# Patient Record
Sex: MALE | Race: OTHER | HISPANIC OR LATINO | Employment: FULL TIME | URBAN - METROPOLITAN AREA
[De-identification: names, ages, dates, MRNs, and addresses within clinical notes are randomized per-mention and may not be internally consistent; named-entity substitution may affect disease eponyms.]

---

## 2020-02-10 ENCOUNTER — APPOINTMENT (EMERGENCY)
Dept: RADIOLOGY | Facility: HOSPITAL | Age: 45
End: 2020-02-10
Payer: COMMERCIAL

## 2020-02-10 ENCOUNTER — HOSPITAL ENCOUNTER (EMERGENCY)
Facility: HOSPITAL | Age: 45
Discharge: HOME | End: 2020-02-10
Attending: EMERGENCY MEDICINE | Admitting: EMERGENCY MEDICINE
Payer: COMMERCIAL

## 2020-02-10 VITALS
WEIGHT: 250 LBS | SYSTOLIC BLOOD PRESSURE: 149 MMHG | HEART RATE: 79 BPM | RESPIRATION RATE: 17 BRPM | OXYGEN SATURATION: 98 % | DIASTOLIC BLOOD PRESSURE: 96 MMHG | TEMPERATURE: 97.8 F

## 2020-02-10 DIAGNOSIS — J02.9 VIRAL PHARYNGITIS: Primary | ICD-10-CM

## 2020-02-10 DIAGNOSIS — M79.646 FINGER PAIN: ICD-10-CM

## 2020-02-10 LAB — S PYO DNA THROAT QL NAA+PROBE: NORMAL

## 2020-02-10 PROCEDURE — 99284 EMERGENCY DEPT VISIT MOD MDM: CPT | Performed by: EMERGENCY MEDICINE

## 2020-02-10 PROCEDURE — 87651 STREP A DNA AMP PROBE: CPT | Performed by: EMERGENCY MEDICINE

## 2020-02-10 PROCEDURE — 99283 EMERGENCY DEPT VISIT LOW MDM: CPT

## 2020-02-10 PROCEDURE — 73140 X-RAY EXAM OF FINGER(S): CPT

## 2020-02-10 RX ADMIN — DEXAMETHASONE SODIUM PHOSPHATE 10 MG: 10 INJECTION, SOLUTION INTRAMUSCULAR; INTRAVENOUS at 18:34

## 2020-02-10 NOTE — ED PROVIDER NOTES
History  Chief Complaint   Patient presents with    Sore Throat     Pt reports sore throat starting today, family members dxed with strep at home     Finger Pain     Pt c/o right 4th finger pain and swelling for weeks      Patient presents for evaluation of a sore throat and finger pain  Patient states that everyone in his house has recently been diagnosed with sore throat, his 2 kids that are there currently as well as his 2 children who are currently in [facility]   He complains of having a sore throat which is worse with swallowing  Denies any fever, chills, nausea, vomiting, difficulty handling oral secretions, change in voice, difficulty swallowing, difficulty breathing, shortness of breath  Patient also is complaining of difficulty extending his 4th digit at the MCP joint when it is fully flex  When this occurs he is unable to extended any needs to the provide downward traction on the joint to allow to pop back in place  He states this is been going on for the past couple months and can remember no distinct trauma to the area  He endorses some pain on the dorsal and volar aspect of the joint with no radiations up into his wrist   Denies any changes sensation or loss of strength throughout  Patient has no other complaints at this time  None       History reviewed  No pertinent past medical history  History reviewed  No pertinent surgical history  History reviewed  No pertinent family history  I have reviewed and agree with the history as documented  Social History     Tobacco Use    Smoking status: Current Every Day Smoker     Packs/day: 0 20    Smokeless tobacco: Never Used   Substance Use Topics    Alcohol use: Not Currently    Drug use: Not Currently        Review of Systems   Constitutional: Negative for activity change, chills, fatigue and fever  HENT: Positive for sore throat  Negative for congestion, postnasal drip, rhinorrhea, trouble swallowing and voice change  Respiratory: Negative for cough and shortness of breath  Cardiovascular: Negative for chest pain and palpitations  Gastrointestinal: Negative for abdominal distention, abdominal pain, constipation, diarrhea, nausea and vomiting  Genitourinary: Negative for dysuria and hematuria  Musculoskeletal: Positive for arthralgias and myalgias  Negative for neck pain  Neurological: Negative for dizziness, syncope, light-headedness and headaches  All other systems reviewed and are negative  Physical Exam  ED Triage Vitals [02/10/20 1631]   Temperature Pulse Respirations Blood Pressure SpO2   97 8 °F (36 6 °C) 79 17 149/96 98 %      Temp Source Heart Rate Source Patient Position - Orthostatic VS BP Location FiO2 (%)   Tympanic Monitor Sitting Right arm --      Pain Score       6             Orthostatic Vital Signs  Vitals:    02/10/20 1631   BP: 149/96   Pulse: 79   Patient Position - Orthostatic VS: Sitting       Physical Exam   Constitutional: He is oriented to person, place, and time  He appears well-developed and well-nourished  No distress  HENT:   Head: Normocephalic and atraumatic  Right Ear: Tympanic membrane, external ear and ear canal normal  No drainage or swelling  Left Ear: Tympanic membrane, external ear and ear canal normal  No drainage or swelling  Mouth/Throat: Uvula is midline and oropharynx is clear and moist  No uvula swelling  No oropharyngeal exudate or tonsillar abscesses  Tonsils are 2+ on the right  Tonsils are 2+ on the left  No tonsillar exudate  Eyes: Pupils are equal, round, and reactive to light  Conjunctivae and EOM are normal  Right eye exhibits no discharge  Left eye exhibits no discharge  Neck: Normal range of motion  Neck supple  No JVD present  No tracheal deviation present  Cardiovascular: Normal rate, regular rhythm, normal heart sounds and intact distal pulses  Exam reveals no gallop and no friction rub  No murmur heard    Pulmonary/Chest: Effort normal and breath sounds normal  No respiratory distress  He has no wheezes  He has no rales  Abdominal: Soft  Bowel sounds are normal  He exhibits no distension and no mass  There is no tenderness  There is no guarding  Musculoskeletal: Normal range of motion  He exhibits no edema, tenderness or deformity  Neurological: He is alert and oriented to person, place, and time  No cranial nerve deficit or sensory deficit  He exhibits normal muscle tone  Coordination normal    Skin: He is not diaphoretic  Psychiatric: He has a normal mood and affect  His behavior is normal  Judgment and thought content normal    Vitals reviewed  ED Medications  Medications   dexamethasone 10 mg/mL oral liquid 10 mg 1 mL (10 mg Oral Given 2/10/20 1834)       Diagnostic Studies  Results Reviewed     Procedure Component Value Units Date/Time    Strep A PCR [[id number]]  (Normal) Collected:  02/10/20 1701    Lab Status:  Final result Specimen:  Throat Updated:  02/10/20 1816     STREP A PCR None Detected                 XR finger right fourth digit-ring    (Results Pending)         Procedures  Procedures      ED Course                               MDM  Number of Diagnoses or Management Options  Finger pain:   Viral pharyngitis:   Diagnosis management comments: X-ray of the right hand failed to show any acute pathology  Strep swab is negative  Provided Decadron here in the emergency department for improvement of his symptoms  Advised him to follow-up with orthopedics for further evaluation of his left finger discomfort          Disposition  Final diagnoses:   Viral pharyngitis   Finger pain     Time reflects when diagnosis was documented in both MDM as applicable and the Disposition within this note     Time User Action Codes Description Comment    2/10/2020  6:31 PM Roselyn Ocampo Add [J02 9] Viral pharyngitis     2/10/2020  6:32 PM Roselyn Ocampo Add [L53 033] Finger pain       ED Disposition     ED Disposition Condition Date/Time Comment    Discharge Stable Mon Feb 10, 2020  6:31 PM Marat Delarosa discharge to home/self care  Follow-up Information     Follow up With Specialties Details Why [address] [facility] Emergency Department Emergency Medicine   [address]  [phone]  ED, [address] [id number] [facility]   [address] [id number]  [phone] [address] [place] [address], [address] [place] [address], [address] [place] [address]    Your PCP  In 3 days             There are no discharge medications for this patient  No discharge procedures on file  ED Provider  Attending physically available and evaluated Marta Delarosa I managed the patient along with the ED Attending      Electronically Signed by         Tristian Vaca MD  02/11/20 0056

## 2020-02-11 NOTE — ED ATTENDING ATTESTATION
2/10/2020  ICarey MD, saw and evaluated the patient  I have discussed the patient with the resident/non-physician practitioner and agree with the resident's/non-physician practitioner's findings, Plan of Care, and MDM as documented in the resident's/non-physician practitioner's note, except where noted  All available labs and Radiology studies were reviewed  I was present for key portions of any procedure(s) performed by the resident/non-physician practitioner and I was immediately available to provide assistance  At this point I agree with the current assessment done in the Emergency Department  I have conducted an independent evaluation of this patient a history and physical is as follows:  Patient with sore throat, has sick contacts  Also has been having problems with trigger finger    Patient with unremarkable exam   Agree with documentation  ED Course         Critical Care Time  Procedures

## 2020-07-10 ENCOUNTER — HOSPITAL ENCOUNTER (EMERGENCY)
Facility: HOSPITAL | Age: 45
Discharge: HOME | End: 2020-07-10
Attending: EMERGENCY MEDICINE | Admitting: EMERGENCY MEDICINE
Payer: MEDICARE

## 2020-07-10 VITALS
SYSTOLIC BLOOD PRESSURE: 138 MMHG | OXYGEN SATURATION: 98 % | DIASTOLIC BLOOD PRESSURE: 76 MMHG | TEMPERATURE: 98.1 F | WEIGHT: 243.83 LBS | RESPIRATION RATE: 18 BRPM | HEART RATE: 74 BPM

## 2020-07-10 DIAGNOSIS — L02.91 ABSCESS: Primary | ICD-10-CM

## 2020-07-10 PROCEDURE — 99284 EMERGENCY DEPT VISIT MOD MDM: CPT | Performed by: PHYSICIAN ASSISTANT

## 2020-07-10 PROCEDURE — 99282 EMERGENCY DEPT VISIT SF MDM: CPT

## 2020-07-10 PROCEDURE — 10060 I&D ABSCESS SIMPLE/SINGLE: CPT | Performed by: PHYSICIAN ASSISTANT

## 2020-07-10 RX ORDER — LIDOCAINE HYDROCHLORIDE AND EPINEPHRINE 10; 10 MG/ML; UG/ML
1 INJECTION, SOLUTION INFILTRATION; PERINEURAL ONCE
Status: COMPLETED | OUTPATIENT
Start: 2020-07-10 | End: 2020-07-10

## 2020-07-10 RX ORDER — ACETAMINOPHEN 325 MG/1
650 TABLET ORAL ONCE
Status: COMPLETED | OUTPATIENT
Start: 2020-07-10 | End: 2020-07-10

## 2020-07-10 RX ORDER — CEPHALEXIN 250 MG/1
500 CAPSULE ORAL ONCE
Status: COMPLETED | OUTPATIENT
Start: 2020-07-10 | End: 2020-07-10

## 2020-07-10 RX ORDER — SULFAMETHOXAZOLE AND TRIMETHOPRIM 800; 160 MG/1; MG/1
1 TABLET ORAL ONCE
Status: COMPLETED | OUTPATIENT
Start: 2020-07-10 | End: 2020-07-10

## 2020-07-10 RX ORDER — CEPHALEXIN 500 MG/1
500 CAPSULE ORAL EVERY 6 HOURS SCHEDULED
Qty: 28 CAPSULE | Refills: 0 | Status: SHIPPED | OUTPATIENT
Start: 2020-07-10 | End: 2020-07-17

## 2020-07-10 RX ORDER — SULFAMETHOXAZOLE AND TRIMETHOPRIM 800; 160 MG/1; MG/1
1 TABLET ORAL 2 TIMES DAILY
Qty: 14 TABLET | Refills: 0 | Status: SHIPPED | OUTPATIENT
Start: 2020-07-10 | End: 2020-07-17

## 2020-07-10 RX ADMIN — SULFAMETHOXAZOLE AND TRIMETHOPRIM 1 TABLET: 800; 160 TABLET ORAL at 12:27

## 2020-07-10 RX ADMIN — CEPHALEXIN 500 MG: 250 CAPSULE ORAL at 12:27

## 2020-07-10 RX ADMIN — LIDOCAINE HYDROCHLORIDE,EPINEPHRINE BITARTRATE 1 ML: 10; .01 INJECTION, SOLUTION INFILTRATION; PERINEURAL at 12:27

## 2020-07-10 RX ADMIN — ACETAMINOPHEN 650 MG: 325 TABLET, FILM COATED ORAL at 12:27

## 2020-07-10 NOTE — ED PROVIDER NOTES
History  Chief Complaint   Patient presents with    Abscess     to left leg  has tatoo (20 years ago)  red, swollen, painful, unsure if bit by anything, he is a , used warm compresses      The patient is a 65-year-old male with no significant past medical history presents to the emergency department for evaluation of an abscess to his left anterior lower leg  Patient reports he is a , and he is not sure if he sustained a small cut or a bug bite  He states on Wednesday, he noticed a little raised area with increasing redness  He states he tried squeezing it and was able to get some pus out of it  He states over the last couple of days it has become more swollen and painful  He noted area of redness with increasing  The patient took some Motrin this morning with little relief  Denies fever, chills, nausea, vomiting or headache  History provided by:  Patient   used: No        None       History reviewed  No pertinent past medical history  History reviewed  No pertinent surgical history  History reviewed  No pertinent family history  I have reviewed and agree with the history as documented  E-Cigarette/Vaping     E-Cigarette/Vaping Substances     Social History     Tobacco Use    Smoking status: Current Every Day Smoker     Packs/day: 0 20     Types: Cigarettes    Smokeless tobacco: Never Used   Substance Use Topics    Alcohol use: Not Currently    Drug use: Yes     Types: Marijuana       Review of Systems   Constitutional: Negative for chills and fever  Musculoskeletal: Positive for myalgias  Skin: Positive for wound (Abscess)  Negative for color change  Hematological: Does not bruise/bleed easily  Physical Exam  Physical Exam   Constitutional: He is oriented to person, place, and time  He appears well-developed and well-nourished  HENT:   Head: Normocephalic and atraumatic     Eyes: Conjunctivae and EOM are normal    Neck: Normal range of motion  Neck supple  Musculoskeletal: Normal range of motion  Legs:  Neurological: He is alert and oriented to person, place, and time  Vital Signs  ED Triage Vitals [07/10/20 1144]   Temperature Pulse Respirations Blood Pressure SpO2   98 1 °F (36 7 °C) 74 18 138/76 98 %      Temp Source Heart Rate Source Patient Position - Orthostatic VS BP Location FiO2 (%)   Oral Monitor -- Left arm --      Pain Score       5           Vitals:    07/10/20 1144   BP: 138/76   Pulse: 74         Visual Acuity      ED Medications  Medications   lidocaine-epinephrine (XYLOCAINE/EPINEPHRINE) 1 %-1:100,000 injection 1 mL (1 mL Infiltration Given 7/10/20 1227)   cephalexin (KEFLEX) capsule 500 mg (500 mg Oral Given 7/10/20 1227)   sulfamethoxazole-trimethoprim (BACTRIM DS) 800-160 mg per tablet 1 tablet (1 tablet Oral Given 7/10/20 1227)   acetaminophen (TYLENOL) tablet 650 mg (650 mg Oral Given 7/10/20 1227)       Diagnostic Studies  Results Reviewed     None                 No orders to display              Procedures  Incision and drain  Date/Time: 7/10/2020 12:53 PM  Performed by: Alexx Morgan PA-C  Authorized by: Alexx Morgan PA-C     Patient location:  ED  Other Assisting Provider: No    Consent:     Consent obtained:  Verbal    Consent given by:  Patient    Risks discussed:  Bleeding, incomplete drainage, pain, damage to other organs and infection    Alternatives discussed:  No treatment  Universal protocol:     Procedure explained and questions answered to patient or proxy's satisfaction: yes    Location:     Type:  Abscess    Size:  3x3 cm    Location:  Lower extremity    Lower extremity location:  L leg  Pre-procedure details:     Skin preparation:  Betadine  Anesthesia (see MAR for exact dosages):      Anesthesia method:  Local infiltration    Local anesthetic:  Lidocaine 1% WITH epi  Procedure details:     Complexity:  Simple    Needle aspiration: no      Incision types:  Stab incision Scalpel blade:  11    Approach:  Open    Incision depth:  Subcutaneous    Drainage:  Bloody and purulent    Drainage amount: Moderate    Wound treatment:  Wound left open    Packing materials:  None  Post-procedure details:     Patient tolerance of procedure: Tolerated well, no immediate complications             ED Course                                             MDM  Number of Diagnoses or Management Options  Abscess: new and requires workup  Diagnosis management comments: Patient presents with abscess to left lower leg  There is mild surrounding cellulitis  Performed I&D of abscess  Please see procedure note for more detail  Patient reported relief of pain after the I and D  Patient will be started on a course of Bactrim and Keflex  I advised him to keep the abscess loosely covered to allow continuing drainage  I encouraged warm compresses  I advised that he return to the ED if he develops any worsening swelling or red streaking up his leg  He acknowledged understanding  Patient is appropriate for discharge  Amount and/or Complexity of Data Reviewed  Decide to obtain previous medical records or to obtain history from someone other than the patient: yes  Review and summarize past medical records: yes    Risk of Complications, Morbidity, and/or Mortality  Presenting problems: minimal  Diagnostic procedures: minimal  Management options: minimal    Patient Progress  Patient progress: stable        Disposition  Final diagnoses:   Abscess     Time reflects when diagnosis was documented in both MDM as applicable and the Disposition within this note     Time User Action Codes Description Comment    7/10/2020 12:36 PM Savi Blackmon Add [L02 91] Abscess       ED Disposition     ED Disposition Condition Date/Time Comment    Discharge Stable Fri Jul 10, 2020 12:36 PM Sushma Jean-Baptiste discharge to home/self care              Follow-up Information     Follow up With Specialties Details Why Contact Info Additional Information    Janel Hurst MD Family Medicine Schedule an appointment as soon as possible for a visit  As needed [address] [address]  [address] [place] [address] Emergency Department Emergency Medicine  If symptoms worsen [address] [facility] [address] [phone] [facility] ED, [address], [place], [place], [address]          Discharge Medication List as of 7/10/2020 12:37 PM      START taking these medications    Details   cephalexin (KEFLEX) 500 mg capsule Take 1 capsule (500 mg total) by mouth every 6 (six) hours for 7 days, Starting Fri 7/10/2020, Until Fri 7/17/2020, Normal      sulfamethoxazole-trimethoprim (BACTRIM DS) 800-160 mg per tablet Take 1 tablet by mouth 2 (two) times a day for 7 days smx-tmp DS (BACTRIM) 800-160 mg tabs (1tab q12 D10), Starting Fri 7/10/2020, Until Fri 7/17/2020, Normal           No discharge procedures on file      PDMP Review     None          ED Provider  Electronically Signed by           Ata Whatley PA-C  07/10/20 3687

## 2020-07-15 ENCOUNTER — HOSPITAL ENCOUNTER (EMERGENCY)
Facility: HOSPITAL | Age: 45
Discharge: HOME | End: 2020-07-15
Attending: EMERGENCY MEDICINE | Admitting: EMERGENCY MEDICINE
Payer: MEDICARE

## 2020-07-15 VITALS
DIASTOLIC BLOOD PRESSURE: 69 MMHG | WEIGHT: 242 LBS | RESPIRATION RATE: 16 BRPM | TEMPERATURE: 98.3 F | OXYGEN SATURATION: 99 % | SYSTOLIC BLOOD PRESSURE: 137 MMHG | HEART RATE: 66 BPM

## 2020-07-15 DIAGNOSIS — L25.9 CONTACT DERMATITIS: Primary | ICD-10-CM

## 2020-07-15 PROCEDURE — 99282 EMERGENCY DEPT VISIT SF MDM: CPT

## 2020-07-15 PROCEDURE — 99284 EMERGENCY DEPT VISIT MOD MDM: CPT | Performed by: PHYSICIAN ASSISTANT

## 2020-07-15 RX ORDER — SKIN CLEANSER COMBINATION NO.8
1 CLEANSER (GRAM) TOPICAL 3 TIMES DAILY PRN
Qty: 1 TUBE | Refills: 0 | Status: SHIPPED | OUTPATIENT
Start: 2020-07-15

## 2020-07-15 RX ORDER — PREDNISONE 10 MG/1
TABLET ORAL
Qty: 40 TABLET | Refills: 0 | Status: SHIPPED | OUTPATIENT
Start: 2020-07-15

## 2020-07-15 RX ORDER — PREDNISONE 20 MG/1
60 TABLET ORAL ONCE
Status: COMPLETED | OUTPATIENT
Start: 2020-07-15 | End: 2020-07-15

## 2020-07-15 RX ADMIN — PREDNISONE 60 MG: 20 TABLET ORAL at 23:47

## 2020-07-19 NOTE — ED PROVIDER NOTES
EMERGENCY MEDICINE NOTE        PATIENT IDENTIFICATION PHYSICIAN/SERVICE INFORMATION   Name: Romana Linker  MRN: [id number]  Virgilgfurt: 1975  Age/Sex: 54 y o  male  Preferred Language: English  Code Status: No Order  Encounter Date: 7/15/2020  Attending Physician: Brigette Bass MD  Admitting Physician: No admitting provider for patient encounter  Primary Care Physician: Pennie Kern MD         Primary Care Phone: [facility]     Chief Complaint   Patient presents with   [facility] Eddie Sings     Pt reports working outside where there was poison Eddie Sings and poison oak, noticed a rash on his arms starting Sunday night and progressively worsening to his general body  (+) itchiness and forearm edema         HISTORY OF PRESENT ILLNESS       History provided by:  Patient   used: No    Poison Ivy   Location:  Arms, legs, neck  Quality:  Itching  Severity:  Moderate  Onset quality:  Gradual  Timing:  Constant  Progression:  Waxing and waning  Chronicity:  New  Context:  Pt reports working outside where there was poison Ivy and poison oak, noticed a rash on his arms starting Dieudonne night and progressively worsening to his general body  Relieved by:  Nothing  Worsened by:  Nothing  Ineffective treatments:  Calamine lotion  Associated symptoms: rash    Associated symptoms: no congestion, no cough, no fatigue, no fever, no myalgias, no nausea, no rhinorrhea, no shortness of breath, no sore throat, no vomiting and no wheezing          PAST MEDICAL AND SURGICAL HISTORY     History reviewed  No pertinent past medical history  History reviewed  No pertinent surgical history  History reviewed  No pertinent family history      E-Cigarette/Vaping    E-Cigarette Use Current Every Day User      E-Cigarette/Vaping Substances    Nicotine Yes     THC No     CBD Yes      Social History     Tobacco Use    Smoking status: Current Every Day Smoker     Packs/day: 0 20     Types: Cigarettes    Smokeless tobacco: Never Used   Substance Use Topics    Alcohol use: Not Currently    Drug use: Yes     Types: Marijuana         ALLERGIES     No Known Allergies      HOME MEDICATIONS     Prior to Admission Medications   Prescriptions Last Dose Informant Patient Reported? Taking? cephalexin (KEFLEX) 500 mg capsule   No No   Sig: Take 1 capsule (500 mg total) by mouth every 6 (six) hours for 7 days   sulfamethoxazole-trimethoprim (BACTRIM DS) 800-160 mg per tablet   No No   Sig: Take 1 tablet by mouth 2 (two) times a day for 7 days smx-tmp DS (BACTRIM) 800-160 mg tabs (1tab q12 D10)      Facility-Administered Medications: None         REVIEW OF SYSTEMS     Review of Systems   Constitutional: Negative for fatigue and fever  HENT: Negative for congestion, rhinorrhea and sore throat  Respiratory: Negative for cough, shortness of breath and wheezing  Gastrointestinal: Negative for nausea and vomiting  Musculoskeletal: Negative for myalgias  Skin: Positive for rash  Negative for wound  All other systems reviewed and are negative  PHYSICAL EXAMINATION     ED Triage Vitals [07/15/20 2206]   Temperature Pulse Respirations Blood Pressure SpO2   98 3 °F (36 8 °C) 66 16 137/69 99 %      Temp Source Heart Rate Source Patient Position - Orthostatic VS BP Location FiO2 (%)   Oral Monitor Sitting Left arm --      Pain Score       5         Wt Readings from Last 3 Encounters:   07/15/20 110 kg (242 lb)   07/10/20 111 kg (243 lb 13 3 oz)   02/10/20 113 kg (250 lb)         Physical Exam   Constitutional: He is oriented to person, place, and time  He appears well-developed and well-nourished  No distress  HENT:   Head: Normocephalic and atraumatic  Mouth/Throat: Oropharynx is clear and moist    Eyes: Pupils are equal, round, and reactive to light  Conjunctivae and EOM are normal    Neck: Normal range of motion  Neck supple     Cardiovascular: Normal rate, regular rhythm and intact distal pulses  Pulmonary/Chest: Effort normal  No respiratory distress  Musculoskeletal: Normal range of motion  Neurological: He is alert and oriented to person, place, and time  Skin: Skin is warm  Capillary refill takes less than 2 seconds  Rash (erythematous patches scattered arms, legs, neck) noted  He is not diaphoretic  Nursing note and vitals reviewed  DIAGNOSTIC RESULTS     Laboratory results:    Labs Reviewed - No data to display    All labs reviewed and utilized in the medical decision making process    Radiology results:    No orders to display       All radiology studies independently viewed by me and interpreted by the radiologist       PROCEDURES     Procedures        ASSESSMENT AND PLAN     MDM  Number of Diagnoses or Management Options  Contact dermatitis: new, no workup     Amount and/or Complexity of Data Reviewed  Review and summarize past medical records: yes    Risk of Complications, Morbidity, and/or Mortality  Presenting problems: low  Diagnostic procedures: low  Management options: low    Patient Progress  Patient progress: stable      Initial ED assessment:  Damaris Zendejas is a 54 y o  male  who presents with rash  Vitals signs reviewed and WNL  Physical examination is remarkable for erythematous patches scattered arms, legs, neck    Initial Ddx  includes but is not limited to:   allergic reaction, urticaria, cellulitis, contact dermatitis, allergic dermatitis, poison ivy/oak/sumac, vasculitis, herpes zoster, infectious etiology, scabies  Initial ED plan:   Plan will be to treat symptomatically   Final ED summary/disposition: Home care recommendations given with discharge paperwork  Return to ED instructions given if new/worsening sxs  Verbalized understanding  MDM  Reviewed: previous chart, nursing note and vitals          ED COURSE OF CARE AND REASSESSMENT          US AUDIT      Most Recent Value   Initial Alcohol Screen: US AUDIT-C    1   How often do you have a drink containing alcohol? 1 Filed at: 07/15/2020 2207   Audit-C Score  1 Filed at: 07/15/2020 2207                  MARLA/DAST-10      Most Recent Value   How many times in the past year have you    Used an illegal drug or used a prescription medication for non-medical reasons? Never Filed at: 07/15/2020 2207                          Medications   predniSONE tablet 60 mg (60 mg Oral Given 7/15/20 6760)         FINAL IMPRESSION     Final diagnoses:   Contact dermatitis         DISPOSITION AND PLANNING     Time reflects when diagnosis was documented in both MDM as applicable and the Disposition within this note     Time User Action Codes Description Comment    7/15/2020 11:31 PM Yenni Sanches Add [L25 9] Contact dermatitis       ED Disposition     ED Disposition Condition Date/Time Comment    Discharge Stable Wed Jul 15, 2020 11:31 PM Brodie Echavarria discharge to home/self care  Follow-up Information    None             PATIENT REFERRAL     No follow-up provider specified        DISCHARGE MEDICATIONS     Discharge Medication List as of 7/15/2020 11:34 PM      START taking these medications    Details   Poison Ivy Treatments (ZANFEL) MISC Apply 1 application topically 3 (three) times a day as needed (for pruritis), Starting Wed 7/15/2020, Print      predniSONE 10 mg tablet 5 tab once a day for 2 days, 4 tab once a day for 3 days, 3 tab once a day for 3 days, 2 tab once a day for 3 days, 1 tab once a day for 3 days, Print         CONTINUE these medications which have NOT CHANGED    Details   cephalexin (KEFLEX) 500 mg capsule Take 1 capsule (500 mg total) by mouth every 6 (six) hours for 7 days, Starting Fri 7/10/2020, Until Fri 7/17/2020, Normal      sulfamethoxazole-trimethoprim (BACTRIM DS) 800-160 mg per tablet Take 1 tablet by mouth 2 (two) times a day for 7 days smx-tmp DS (BACTRIM) 800-160 mg tabs (1tab q12 D10), Starting Fri 7/10/2020, Until Fri 7/17/2020, Normal             No discharge procedures on file      PDMP Review     None          CEASAR [place], [place]  07/19/20 7960

## 2021-12-22 ENCOUNTER — NURSE TRIAGE (OUTPATIENT)
Dept: OTHER | Facility: OTHER | Age: 46
End: 2021-12-22

## 2021-12-22 DIAGNOSIS — Z11.59 SPECIAL SCREENING EXAMINATION FOR VIRAL DISEASE: Primary | ICD-10-CM

## 2021-12-23 PROCEDURE — 87636 SARSCOV2 & INF A&B AMP PRB: CPT | Performed by: FAMILY MEDICINE

## 2021-12-27 ENCOUNTER — TELEPHONE (OUTPATIENT)
Dept: OTHER | Facility: OTHER | Age: 46
End: 2021-12-27

## 2022-06-16 ENCOUNTER — HOSPITAL ENCOUNTER (EMERGENCY)
Facility: HOSPITAL | Age: 47
Discharge: HOME | End: 2022-06-16
Attending: EMERGENCY MEDICINE
Payer: COMMERCIAL

## 2022-06-16 VITALS
DIASTOLIC BLOOD PRESSURE: 84 MMHG | HEART RATE: 81 BPM | BODY MASS INDEX: 35.74 KG/M2 | OXYGEN SATURATION: 100 % | SYSTOLIC BLOOD PRESSURE: 159 MMHG | HEIGHT: 69 IN | RESPIRATION RATE: 20 BRPM | TEMPERATURE: 97.7 F

## 2022-06-16 DIAGNOSIS — L02.91 ABSCESS: Primary | ICD-10-CM

## 2022-06-16 PROCEDURE — 10061 I&D ABSCESS COMP/MULTIPLE: CPT | Performed by: EMERGENCY MEDICINE

## 2022-06-16 PROCEDURE — 99284 EMERGENCY DEPT VISIT MOD MDM: CPT | Performed by: EMERGENCY MEDICINE

## 2022-06-16 PROCEDURE — 99283 EMERGENCY DEPT VISIT LOW MDM: CPT

## 2022-06-16 PROCEDURE — 96372 THER/PROPH/DIAG INJ SC/IM: CPT

## 2022-06-16 RX ORDER — LIDOCAINE HYDROCHLORIDE AND EPINEPHRINE 10; 10 MG/ML; UG/ML
5 INJECTION, SOLUTION INFILTRATION; PERINEURAL ONCE
Status: COMPLETED | OUTPATIENT
Start: 2022-06-16 | End: 2022-06-16

## 2022-06-16 RX ORDER — KETOROLAC TROMETHAMINE 30 MG/ML
30 INJECTION, SOLUTION INTRAMUSCULAR; INTRAVENOUS ONCE
Status: COMPLETED | OUTPATIENT
Start: 2022-06-16 | End: 2022-06-16

## 2022-06-16 RX ORDER — SULFAMETHOXAZOLE AND TRIMETHOPRIM 800; 160 MG/1; MG/1
1 TABLET ORAL ONCE
Status: COMPLETED | OUTPATIENT
Start: 2022-06-16 | End: 2022-06-16

## 2022-06-16 RX ORDER — SULFAMETHOXAZOLE AND TRIMETHOPRIM 800; 160 MG/1; MG/1
1 TABLET ORAL 2 TIMES DAILY
Qty: 14 TABLET | Refills: 0 | Status: SHIPPED | OUTPATIENT
Start: 2022-06-16 | End: 2022-06-23

## 2022-06-16 RX ORDER — ACETAMINOPHEN 325 MG/1
975 TABLET ORAL ONCE
Status: COMPLETED | OUTPATIENT
Start: 2022-06-16 | End: 2022-06-16

## 2022-06-16 RX ADMIN — LIDOCAINE HYDROCHLORIDE AND EPINEPHRINE 5 ML: 10; 10 INJECTION, SOLUTION INFILTRATION; PERINEURAL at 14:13

## 2022-06-16 RX ADMIN — SULFAMETHOXAZOLE AND TRIMETHOPRIM 1 TABLET: 800; 160 TABLET ORAL at 13:44

## 2022-06-16 RX ADMIN — ACETAMINOPHEN 975 MG: 325 TABLET ORAL at 13:44

## 2022-06-16 RX ADMIN — KETOROLAC TROMETHAMINE 30 MG: 30 INJECTION, SOLUTION INTRAMUSCULAR at 13:44

## 2022-06-16 NOTE — ED PROVIDER NOTES
History  Chief Complaint   Patient presents with    Abscess     Pt has had abscess on upper back for 2-3 days and has noticed little puss/drainage coming from it, redness noted at site  Pt denies fever     26-year-old male, coming into the ED for evaluation an abscess his upper back that he noticed 2-3 days ago  Denies any systemic symptoms such as fevers, chills, sweats  He states it drained a tiny little bit, and he did try to squeeze it  He does have a history of abscesses in the past       History provided by:  Patient   used: No    Abscess  Location:  Torso  Torso abscess location:  Upper back      Prior to Admission Medications   Prescriptions Last Dose Informant Patient Reported? Taking? Poison Ivy Treatments (ZANFEL) MISC   No No   Sig: Apply 1 application topically 3 (three) times a day as needed (for pruritis)   predniSONE 10 mg tablet   No No   Si tab once a day for 2 days, 4 tab once a day for 3 days, 3 tab once a day for 3 days, 2 tab once a day for 3 days, 1 tab once a day for 3 days      Facility-Administered Medications: None       History reviewed  No pertinent past medical history  History reviewed  No pertinent surgical history  History reviewed  No pertinent family history  I have reviewed and agree with the history as documented  E-Cigarette/Vaping    E-Cigarette Use Current Every Day User      E-Cigarette/Vaping Substances    Nicotine Yes     THC No     CBD Yes      Social History     Tobacco Use    Smoking status: Current Every Day Smoker     Packs/day: 0 20     Types: Cigarettes    Smokeless tobacco: Never Used   Vaping Use    Vaping Use: Every day    Substances: Nicotine, CBD   Substance Use Topics    Alcohol use: Not Currently    Drug use: Yes     Types: Marijuana       Review of Systems   All other systems reviewed and are negative  Physical Exam  Physical Exam  Vitals and nursing note reviewed     Constitutional:       General: He is not in acute distress  Appearance: Normal appearance  He is normal weight  HENT:      Head: Normocephalic and atraumatic  Right Ear: External ear normal       Left Ear: External ear normal       Nose: Nose normal  No congestion or rhinorrhea  Mouth/Throat:      Mouth: Mucous membranes are moist       Pharynx: Oropharynx is clear  Eyes:      General: No scleral icterus  Right eye: No discharge  Left eye: No discharge  Conjunctiva/sclera: Conjunctivae normal    Cardiovascular:      Rate and Rhythm: Normal rate and regular rhythm  Pulses: Normal pulses  Heart sounds: Normal heart sounds  Pulmonary:      Effort: Pulmonary effort is normal  No respiratory distress  Breath sounds: Normal breath sounds  Abdominal:      General: Abdomen is flat  Palpations: Abdomen is soft  Tenderness: There is no abdominal tenderness  Musculoskeletal:         General: No swelling  Normal range of motion  Cervical back: Normal range of motion and neck supple  Skin:     General: Skin is warm and dry  Capillary Refill: Capillary refill takes less than 2 seconds  Comments: 2x2 cm abscess, fluctuance, induration, erythema, tenderness w/ central scabbing  No active drainage  Neurological:      General: No focal deficit present  Mental Status: He is alert and oriented to person, place, and time  Mental status is at baseline     Psychiatric:         Mood and Affect: Mood normal          Behavior: Behavior normal          Vital Signs  ED Triage Vitals   Temperature Pulse Respirations Blood Pressure SpO2   06/16/22 1210 06/16/22 1210 06/16/22 1210 06/16/22 1210 06/16/22 1210   97 7 °F (36 5 °C) 81 20 159/84 100 %      Temp Source Heart Rate Source Patient Position - Orthostatic VS BP Location FiO2 (%)   06/16/22 1210 06/16/22 1210 06/16/22 1210 06/16/22 1210 --   Oral Monitor Sitting Right arm       Pain Score       06/16/22 1344       8           Vitals: 06/16/22 1210   BP: 159/84   Pulse: 81   Patient Position - Orthostatic VS: Sitting         Visual Acuity      ED Medications  Medications   sulfamethoxazole-trimethoprim (BACTRIM DS) 800-160 mg per tablet 1 tablet (1 tablet Oral Given 6/16/22 1344)   lidocaine-epinephrine (XYLOCAINE/EPINEPHRINE) 1 %-1:100,000 injection 5 mL (5 mL Infiltration Given by Other 6/16/22 1413)   acetaminophen (TYLENOL) tablet 975 mg (975 mg Oral Given 6/16/22 1344)   ketorolac (TORADOL) injection 30 mg (30 mg Intramuscular Given 6/16/22 1344)       Diagnostic Studies  Results Reviewed     Procedure Component Value Units Date/Time    Wound culture and Gram stain [[id number]]     Lab Status: No result Specimen: Wound from Back                  No orders to display              Procedures  Incision and drain    Date/Time: 6/16/2022 2:17 PM  Performed by: Marylin Naik DO  Authorized by: Marylin Naik DO   Universal Protocol:  Consent: Verbal consent obtained  Risks and benefits: risks, benefits and alternatives were discussed  Consent given by: patient  Time out: Immediately prior to procedure a "time out" was called to verify the correct patient, procedure, equipment, support staff and site/side marked as required  Patient understanding: patient states understanding of the procedure being performed  Patient consent: the patient's understanding of the procedure matches consent given  Procedure consent: procedure consent matches procedure scheduled  Relevant documents: relevant documents present and verified  Test results: test results available and properly labeled  Site marked: the operative site was marked  Radiology Images displayed and confirmed   If images not available, report reviewed: imaging studies available  Required items: required blood products, implants, devices, and special equipment available  Patient identity confirmed: verbally with patient      Patient location:  ED  Location:     Type:  Abscess    Size:  2 x 2 cm Location:  Trunk    Trunk location:  Back  Pre-procedure details:     Skin preparation:  Chloraprep  Anesthesia (see MAR for exact dosages): Anesthesia method:  Local infiltration    Local anesthetic:  Lidocaine 1% WITH epi  Procedure details:     Complexity:  Simple    Needle aspiration: no      Incision types:  Single straight    Scalpel blade:  11    Approach:  Open    Incision depth:  Subcutaneous    Wound management:  Probed and deloculated    Drainage:  Purulent    Drainage amount: Moderate    Wound treatment:  Drain placed    Packing materials:  1/2 in iodoform gauze  Post-procedure details:     Patient tolerance of procedure: Tolerated well, no immediate complications             ED Course                                             MDM  Number of Diagnoses or Management Options  Abscess: new and requires workup  Diagnosis management comments: 50 yo M w/ back abscess  I&D performed, moderate purulent drainage expressed, packed, f/u 2 days for recheck  Start on bactrim  Amount and/or Complexity of Data Reviewed  Clinical lab tests: ordered and reviewed    Risk of Complications, Morbidity, and/or Mortality  Presenting problems: low  Diagnostic procedures: low  Management options: low    Patient Progress  Patient progress: stable      Disposition  Final diagnoses:   Abscess     Time reflects when diagnosis was documented in both MDM as applicable and the Disposition within this note     Time User Action Codes Description Comment    6/16/2022  2:20 PM Kay Pool Add [L02 91] Abscess       ED Disposition     ED Disposition   Discharge    Condition   Stable    Date/Time   Thu Jun 16, 2022  2:20 PM    Comment   Malinda Norris discharge to home/self care                 Follow-up Information     Follow up With Specialties Details Why Contact Info Additional Information    [address] Emergency Department Emergency Medicine In 2 days For wound re-check [address] [facility] [address] [facility] [address] [id number] [facility] Emergency Department, [address], [place], [place], [address]          Discharge Medication List as of 6/16/2022  2:20 PM      START taking these medications    Details   sulfamethoxazole-trimethoprim (BACTRIM DS) 800-160 mg per tablet Take 1 tablet by mouth 2 (two) times a day for 7 days smx-tmp DS (BACTRIM) 800-160 mg tabs (1tab q12 D10), Starting Thu 6/16/2022, Until Thu 6/23/2022, Normal         CONTINUE these medications which have NOT CHANGED    Details   Poison Ivy Treatments (ZANFEL) MISC Apply 1 application topically 3 (three) times a day as needed (for pruritis), Starting Wed 7/15/2020, Print      predniSONE 10 mg tablet 5 tab once a day for 2 days, 4 tab once a day for 3 days, 3 tab once a day for 3 days, 2 tab once a day for 3 days, 1 tab once a day for 3 days, Print             No discharge procedures on file      PDMP Review     None          ED Provider  Electronically Signed by           Lida Villalobos DO  06/16/22 1946

## 2022-07-17 ENCOUNTER — HOSPITAL ENCOUNTER (EMERGENCY)
Facility: HOSPITAL | Age: 47
Discharge: HOME | End: 2022-07-17
Attending: EMERGENCY MEDICINE | Admitting: EMERGENCY MEDICINE
Payer: COMMERCIAL

## 2022-07-17 VITALS
SYSTOLIC BLOOD PRESSURE: 146 MMHG | DIASTOLIC BLOOD PRESSURE: 84 MMHG | RESPIRATION RATE: 16 BRPM | HEART RATE: 84 BPM | OXYGEN SATURATION: 98 % | TEMPERATURE: 97.6 F

## 2022-07-17 DIAGNOSIS — L03.90 CELLULITIS: ICD-10-CM

## 2022-07-17 DIAGNOSIS — L02.91 ABSCESS: Primary | ICD-10-CM

## 2022-07-17 PROCEDURE — 99284 EMERGENCY DEPT VISIT MOD MDM: CPT | Performed by: EMERGENCY MEDICINE

## 2022-07-17 PROCEDURE — 99282 EMERGENCY DEPT VISIT SF MDM: CPT

## 2022-07-17 PROCEDURE — 10060 I&D ABSCESS SIMPLE/SINGLE: CPT | Performed by: EMERGENCY MEDICINE

## 2022-07-17 RX ORDER — CEPHALEXIN 250 MG/1
500 CAPSULE ORAL ONCE
Status: COMPLETED | OUTPATIENT
Start: 2022-07-17 | End: 2022-07-17

## 2022-07-17 RX ORDER — CEPHALEXIN 500 MG/1
500 CAPSULE ORAL EVERY 6 HOURS SCHEDULED
Qty: 20 CAPSULE | Refills: 0 | Status: SHIPPED | OUTPATIENT
Start: 2022-07-17 | End: 2022-07-22

## 2022-07-17 RX ORDER — LIDOCAINE HYDROCHLORIDE AND EPINEPHRINE 10; 10 MG/ML; UG/ML
1 INJECTION, SOLUTION INFILTRATION; PERINEURAL ONCE
Status: COMPLETED | OUTPATIENT
Start: 2022-07-17 | End: 2022-07-17

## 2022-07-17 RX ADMIN — CEPHALEXIN 500 MG: 250 CAPSULE ORAL at 19:04

## 2022-07-17 RX ADMIN — LIDOCAINE HYDROCHLORIDE AND EPINEPHRINE 1 ML: 10; 10 INJECTION, SOLUTION INFILTRATION; PERINEURAL at 18:41

## 2022-07-17 NOTE — ED ATTENDING ATTESTATION
7/17/2022  IYovani MD, saw and evaluated the patient  I have discussed the patient with the resident/non-physician practitioner and agree with the resident's/non-physician practitioner's findings, Plan of Care, and MDM as documented in the resident's/non-physician practitioner's note, except where noted  All available labs and Radiology studies were reviewed  I was present for key portions of any procedure(s) performed by the resident/non-physician practitioner and I was immediately available to provide assistance  At this point I agree with the current assessment done in the Emergency Department  I have conducted an independent evaluation of this patient a history and physical is as follows:    ED Course       This is a 62-year-old male patient without any significantly related past medical history presented to the ED today for complaint of an abscess  Patient states that he has had some swelling, induration, erythema to his left flank  He states he has had this for the past 1 week approximately, but over the past 2-3 days it has significantly worsened  He denies any other infectious symptoms  His area has not been draining for the patient  He gets frequent infections, he is a , and states that despite showering 2 to 3 times a day and changing his clothes appropriately he still gets these infections  Upon my evaluation he does have an area of approximately 1 5 cm induration with central fluctuance  He also has a surrounding perimeter of approximately 3 cm to 4 cm of erythema  Patient had a bedside incision and drainage performed here in the ED after ultrasound was performed to confirm a fluid pocket  Patient tolerated the procedure well  He was given a prescription for Keflex for the next 5 days, instructed to follow-up with his PCP for documenting resolution of his wound    Critical Care Time  Procedures

## 2022-07-17 NOTE — ED PROVIDER NOTES
History  Chief Complaint   Patient presents with    Abscess     Pt arrives c/o raised reddened area he's referring to as an abscess on his L torso x 4 days, denies drainage or fever  HPI Connor Barnett is a 54yoM  who presents for evaluation of worsening abscess on left lateral trunk  Approximately 1 week ago, patient was sleeping when he felt something bite him  It seemed like it was getting better, but then 2-3 days ago, it started to be painful  He says it has not drained any pus and he hasn't tried to open it up  He denies f/c, n/v  No h/o MRSA infection but has had similar abscesses develop in the past  He otherwise feels in his normal state of health  Prior to Admission Medications   Prescriptions Last Dose Informant Patient Reported? Taking? Poison Ivy Treatments (ZANFEL) MISC   No No   Sig: Apply 1 application topically 3 (three) times a day as needed (for pruritis)   predniSONE 10 mg tablet   No No   Si tab once a day for 2 days, 4 tab once a day for 3 days, 3 tab once a day for 3 days, 2 tab once a day for 3 days, 1 tab once a day for 3 days      Facility-Administered Medications: None       History reviewed  No pertinent past medical history  History reviewed  No pertinent surgical history  History reviewed  No pertinent family history  I have reviewed and agree with the history as documented  E-Cigarette/Vaping    E-Cigarette Use Current Every Day User      E-Cigarette/Vaping Substances    Nicotine Yes     THC No     CBD Yes      Social History     Tobacco Use    Smoking status: Former Smoker     Packs/day: 0 20     Types: Cigarettes     Quit date: 2021     Years since quittin 0    Smokeless tobacco: Never Used   Vaping Use    Vaping Use: Every day    Substances: Nicotine, CBD   Substance Use Topics    Alcohol use: Not Currently    Drug use: Yes     Types: Marijuana        Review of Systems   Constitutional: Negative for chills and fever  Respiratory: Negative for shortness of breath  Cardiovascular: Negative for chest pain  Gastrointestinal: Negative for nausea and vomiting  All other systems reviewed and are negative  Physical Exam  ED Triage Vitals [07/17/22 1730]   Temperature Pulse Respirations Blood Pressure SpO2   97 6 °F (36 4 °C) 84 16 146/84 98 %      Temp Source Heart Rate Source Patient Position - Orthostatic VS BP Location FiO2 (%)   Oral Monitor Sitting Left arm --      Pain Score       --             Orthostatic Vital Signs  Vitals:    07/17/22 1730   BP: 146/84   Pulse: 84   Patient Position - Orthostatic VS: Sitting       Physical Exam  Vitals and nursing note reviewed  Constitutional:       General: He is not in acute distress  Appearance: He is not ill-appearing, toxic-appearing or diaphoretic  HENT:      Head: Normocephalic and atraumatic  Nose: Nose normal    Eyes:      Extraocular Movements: Extraocular movements intact  Cardiovascular:      Rate and Rhythm: Normal rate  Pulmonary:      Effort: Pulmonary effort is normal    Abdominal:      General: Abdomen is flat  Palpations: Abdomen is soft  Skin:            Comments: See included picture after I&D   Neurological:      Mental Status: He is alert  ED Medications  Medications   lidocaine-epinephrine (XYLOCAINE/EPINEPHRINE) 1 %-1:100,000 injection 1 mL (1 mL Infiltration Given by Other 7/17/22 1841)   cephalexin (KEFLEX) capsule 500 mg (500 mg Oral Given 7/17/22 1904)       Diagnostic Studies  Results Reviewed     None                 No orders to display         Procedures  Incision and drain    Date/Time: 7/17/2022 7:16 PM  Performed by: Darian Rangel MD  Authorized by: Darian Rangel MD   Universal Protocol:  Procedure performed by: (Dr Reagan Saucedo)  Consent: Verbal consent obtained    Consent given by: patient  Patient understanding: patient states understanding of the procedure being performed  Patient identity confirmed: verbally with patient      Patient location:  ED  Location:     Type:  Abscess    Size:  2cm x 2cm    Location:  Trunk    Trunk location:  Abdomen  Pre-procedure details:     Skin preparation:  Chloraprep  Anesthesia (see MAR for exact dosages): Anesthesia method:  Local infiltration    Local anesthetic:  Lidocaine 1% WITH epi  Procedure details:     Complexity:  Simple    Incision types:  Stab incision    Incision depth:  Subcutaneous    Wound management:  Probed and deloculated and irrigated with saline    Drainage:  Bloody, purulent and serosanguinous    Drainage amount: Moderate    Wound treatment:  Wound left open    Packing materials:  None  Post-procedure details:     Patient tolerance of procedure: Tolerated well, no immediate complications          ED Course                                       MDM  Number of Diagnoses or Management Options  Abscess  Cellulitis  Diagnosis management comments: 54yoM with abscess on left lateral trunk  Bedside ultrasound to confirm there was fluid to drain  I&D at bedside  Local anesthetic with 1% lido with epi  Patient tolerated well  Given first dose of abx in the department  Sent home with script for keflex 500mg QID x5 days  Encouraged him to follow up with his PCP for wound re-check  Counseled him on return precautions  Patient discharged in stable condition  Patient Progress  Patient progress: stable      Disposition  Final diagnoses:   Abscess   Cellulitis     Time reflects when diagnosis was documented in both MDM as applicable and the Disposition within this note     Time User Action Codes Description Comment    7/17/2022  6:54 PM Adelene Gehrig Add [L02 91] Abscess     7/17/2022  6:54 PM Adelene Gehrig Add [L03 90] Cellulitis       ED Disposition     ED Disposition   Discharge    Condition   Stable    Date/Time   Sun Jul 17, 2022  6:54 PM    Luh Ryan Brain discharge to home/self care                 Follow-up Information Follow up With Specialties Details Why Contact Info Additional Information    Sid Weiss MD Family Medicine In 3 days For wound re-check [address] [facility] [address]       [address] Emergency Department Emergency Medicine  If symptoms worsen: if the redness and swelling get bigger after one full day on antibiotics, if you develop fevers or chills, or if the pus reaccumulates [address] [facility] [address] [facility] Emergency Department, [address], [address], [address]          Discharge Medication List as of 7/17/2022  7:00 PM      START taking these medications    Details   cephalexin (KEFLEX) 500 mg capsule Take 1 capsule (500 mg total) by mouth every 6 (six) hours for 5 days, Starting Sun 7/17/2022, Until Fri 7/22/2022, Normal         CONTINUE these medications which have NOT CHANGED    Details   Poison Ivy Treatments (ZANFEL) MISC Apply 1 application topically 3 (three) times a day as needed (for pruritis), Starting Wed 7/15/2020, Print      predniSONE 10 mg tablet 5 tab once a day for 2 days, 4 tab once a day for 3 days, 3 tab once a day for 3 days, 2 tab once a day for 3 days, 1 tab once a day for 3 days, Print           No discharge procedures on file  PDMP Review     None           ED Provider  Attending physically available and evaluated Doreen Barboza  I managed the patient along with the ED Attending      Electronically Signed by         Yolanda Núñez MD  07/17/22 Rosio Mark

## 2022-07-17 NOTE — DISCHARGE INSTRUCTIONS
You are prescribed an antibiotic (Keflex 500mg) to help treat the infection  Please take this every 6 hours  It is very important to complete the full course of antibiotics

## 2022-07-29 ENCOUNTER — OFFICE VISIT (OUTPATIENT)
Dept: FAMILY MEDICINE CLINIC | Facility: CLINIC | Age: 47
End: 2022-07-29

## 2022-07-29 VITALS
SYSTOLIC BLOOD PRESSURE: 118 MMHG | OXYGEN SATURATION: 98 % | BODY MASS INDEX: 33.27 KG/M2 | HEART RATE: 56 BPM | TEMPERATURE: 97.9 F | DIASTOLIC BLOOD PRESSURE: 76 MMHG | RESPIRATION RATE: 20 BRPM | HEIGHT: 67 IN | WEIGHT: 212 LBS

## 2022-07-29 DIAGNOSIS — Z51.89 WOUND CHECK, ABSCESS: Primary | ICD-10-CM

## 2022-07-29 DIAGNOSIS — J45.909 ASTHMA DUE TO ENVIRONMENTAL ALLERGIES: ICD-10-CM

## 2022-07-29 PROCEDURE — 99213 OFFICE O/P EST LOW 20 MIN: CPT | Performed by: FAMILY MEDICINE

## 2022-07-29 RX ORDER — LORATADINE 10 MG/1
10 TABLET ORAL DAILY
Qty: 60 TABLET | Refills: 2 | Status: SHIPPED | OUTPATIENT
Start: 2022-07-29

## 2022-07-29 RX ORDER — FLUTICASONE PROPIONATE 50 MCG
1 SPRAY, SUSPENSION (ML) NASAL DAILY
Qty: 11.1 ML | Refills: 3 | Status: SHIPPED | OUTPATIENT
Start: 2022-07-29

## 2022-07-29 NOTE — PATIENT INSTRUCTIONS
Allergies   WHAT YOU NEED TO KNOW:   Allergies are an immune system reaction to a substance called an allergen  Your immune system sees the allergen as harmful and attacks it  An allergic reaction can be mild or life-threatening  A life-threatening reaction is called anaphylaxis  Anaphylaxis is a sudden, life-threatening reaction that needs immediate treatment  DISCHARGE INSTRUCTIONS:   Call 911 for signs or symptoms of anaphylaxis,  such as trouble breathing, swelling in your mouth or throat, or wheezing  You may also have itching, a rash, hives, or feel like you are going to faint  Return to the emergency department if:   You have tingling in your hands or feet  Your skin is red or flushed  Contact your healthcare provider if:   You have questions or concerns about your condition or care  Medicines:   Antihistamines  help decrease itching, sneezing, and swelling  You may take them as a pill or use drops in your nose or eyes  Decongestants  help your nose feel less stuffy  Topical treatments  help decrease itching or swelling  You also may be given nasal sprays or eyedrops  Epinephrine  is used to treat a severe allergic reaction such as anaphylaxis  Take your medicine as directed  Contact your healthcare provider if you think your medicine is not helping or if you have side effects  Tell him of her if you are allergic to any medicine  Keep a list of the medicines, vitamins, and herbs you take  Include the amounts, and when and why you take them  Bring the list or the pill bottles to follow-up visits  Carry your medicine list with you in case of an emergency  Steps to take for signs or symptoms of anaphylaxis:   Immediately  give 1 shot of epinephrine only into the outer thigh muscle  Leave the shot in place  as directed  Your healthcare provider may recommend you leave it in place for up to 10 seconds before you remove it   This helps make sure all of the epinephrine is delivered  Call 911 and go to the emergency department,  even if the shot improved symptoms  Do not drive yourself  Bring the used epinephrine shot with you  Safety precautions to take if you are at risk for anaphylaxis:   Keep 2 shots of epinephrine with you at all times  You may need a second shot, because epinephrine only works for about 20 minutes and symptoms may return  Your healthcare provider can show you and family members how to give the shot  Check the expiration date every month and replace it before it expires  Create an action plan  Your healthcare provider can help you create a written plan that explains the allergy and an emergency plan to treat a reaction  The plan explains when to give a second epinephrine shot if symptoms return or do not improve after the first  Give copies of the action plan and emergency instructions to family members and work staff  Show them how to give a shot of epinephrine  Be careful when you exercise  If you have had exercise-induced anaphylaxis, do not exercise right after you eat  Stop exercising right away if you start to develop any signs or symptoms of anaphylaxis  You may first feel tired, warm, or have itchy skin  Hives, swelling, and severe breathing problems may develop if you continue to exercise  Carry medical alert identification  Wear medical alert jewelry or carry a card that explains the allergy  Ask your healthcare provider where to get these items  Inform all healthcare providers of the allergy  This includes dentists, nurses, doctors, and surgeons  Manage allergies:   Use nasal rinses as directed  Rinse with a saline solution daily  This will help clear allergens out of your nose  Use distilled water if possible  You can also boil tap water and let it cool before you use it  Do not use tap water that has not been boiled  Do not smoke  Allergy symptoms may decrease if you are not around smoke   Nicotine and other chemicals in cigarettes and cigars can cause lung damage  Ask your healthcare provider for information if you currently smoke and need help to quit  E-cigarettes or smokeless tobacco still contain nicotine  Talk to your healthcare provider before you use these products  Prevent allergic reactions:   Do not go outside when pollen counts are high if you have seasonal allergies  Your symptoms may be better if you go outside only in the morning or evening  Use your air conditioner, and change air filters often  Avoid dust, fur, and mold  Dust and vacuum your home often  You may want to wear a mask when you vacuum  Keep pets in certain rooms, and bathe them often  Use a dehumidifier (machine that decreases moisture) to help prevent mold  Do not use products that contain latex if you have a latex allergy  Use nonlatex gloves if you work in healthcare or in food preparation  Always tell healthcare providers about a latex allergy  Avoid areas that attract insects if you have an insect bite or sting allergy  Areas include trash cans, gardens, and picnics  Do not wear bright clothing or strong scents when you will be outside  Prevent an allergic reaction caused by food  You may have a reaction if your food is not prepared safely  For example, you could be served food that touched your trigger food during preparation  This is called cross-contamination  Kitchen tools can also cause cross-contamination  You may also eat baked foods that contain a trigger food you do not know about  Ask if the food contains your trigger food before you handle or eat it  Follow up with your healthcare provider as directed:  Write down your questions so you remember to ask them during your visits  When you have an allergic reaction, write down everything you were exposed to in the 2 hours before the reaction  Take that information to your next visit    © Copyright [employer] 2022 Information is for End User's use only and may not be sold, redistributed or otherwise used for commercial purposes  All illustrations and images included in CareNotes® are the copyrighted property of A [employer] A M [employer]  or Vicente Snow  The above information is an  only  It is not intended as medical advice for individual conditions or treatments  Talk to your doctor, nurse or pharmacist before following any medical regimen to see if it is safe and effective for you

## 2022-07-29 NOTE — ASSESSMENT & PLAN NOTE
Pt seen in the ER 07/17/22 for a spider bite that formed an abscess/cellultis  He presents s/p I&D and a 5 day course of Keflex 500mg QD for a wound check  - Wound healing well, no fluctuance/erythema/heat noted on exam  - He does not need to continue to cover the wound, as it is not actively draining and is completely scabbed over  - He did have some erythema but this was an allergic reaction due to an adhesive he was using  Once he switched bandages, the erythema resolved  - Advised him to continue to monitor for any changes including new redness/warmth/pain   He is agreeable and he will call if needed

## 2022-07-29 NOTE — PROGRESS NOTES
Assessment/Plan:    Wound check, abscess  Pt seen in the ER 07/17/22 for a spider bite that formed an abscess/cellultis  He presents s/p I&D and a 5 day course of Keflex 500mg QD for a wound check  - Wound healing well, no fluctuance/erythema/heat noted on exam  - He does not need to continue to cover the wound, as it is not actively draining and is completely scabbed over  - He did have some erythema but this was an allergic reaction due to an adhesive he was using  Once he switched bandages, the erythema resolved  - Advised him to continue to monitor for any changes including new redness/warmth/pain  He is agreeable and he will call if needed     Asthma due to environmental allergies  Physical exam revealed bilateral mildly erythematous TMs, with slight bulging and clear effusion noted  No blood or suspicion for infection     - He does have allergies, which are worsened by daily exposure to grasses as a   - Reviewed options and advised him to try Flonase intranasally one squirt per nostril daily  - Claritin 10 mg daily        Diagnoses and all orders for this visit:    Wound check, abscess    Asthma due to environmental allergies  -     fluticasone (FLONASE) 50 mcg/act nasal spray; 1 spray into each nostril daily  -     loratadine (CLARITIN) 10 mg tablet; Take 1 tablet (10 mg total) by mouth daily          Subjective:      Patient ID: Kermit Wright is a 49 y o  male  HPI    Kermit Wright is a 49year old male who presents for a wound check today 7/29/2022  He was seen in the ER on 07/17/22 for an abcsess on his left lower trunk  He noticed a spider bite on 07/10/22, sustained while he was sleeping  2-3 days later it started to swell up and become painful  He denied any purulent drainage or fevers  I&D was completed in the ER on 7/17/22 and he was discharged with a 5 day course of Keflex 500mg   He finished his course of abx, however he started to notice some reddening around the wound earlier this week  This redness promptly resolved after he switched band-aid brands  He feels that it is healing well and denies any pain/swelling/warmth today  Of note, he has had similar abscesses form in the past, the most recent on 6/18/22 after his wife shaved his back  No h/o of MRSA infections  He would additionally like to discuss some left sided ear complaints  He states that he bumped his left sided ear gauge into the side of his head at work and it has remained bruised  3-4 weeks ago, he noticed some dried blood in his left ear as well  He denies any ear pain, active bleeding or pain with mastication  Review of Systems   Constitutional: Negative for chills and fever  HENT: Positive for ear pain and rhinorrhea  Negative for sore throat  Eyes: Negative for pain and visual disturbance  Respiratory: Negative for cough and shortness of breath  Cardiovascular: Negative for chest pain and palpitations  Gastrointestinal: Negative for abdominal pain and vomiting  Genitourinary: Negative for dysuria and hematuria  Musculoskeletal: Negative for arthralgias and back pain  Skin: Positive for wound (healing - left lower trunk )  Negative for color change and rash  Neurological: Negative for seizures and syncope  All other systems reviewed and are negative  Objective:      /76 (BP Location: Left arm, Patient Position: Sitting, Cuff Size: Large)   Pulse 56   Temp 97 9 °F (36 6 °C) (Temporal)   Resp 20   Ht 5' 6 9" (1 699 m)   Wt 96 2 kg (212 lb)   SpO2 98%   BMI 33 30 kg/m²          Physical Exam  Constitutional:       Appearance: Normal appearance  HENT:      Head: Normocephalic  Ears:      Comments: Bilateral TMs with slight bulging and clear effusion noted  No erythema     Nose: Nose normal    Cardiovascular:      Rate and Rhythm: Normal rate and regular rhythm  Heart sounds: Normal heart sounds     Pulmonary:      Effort: Pulmonary effort is normal       Breath sounds: Normal breath sounds  Abdominal:      General: Abdomen is flat  Palpations: Abdomen is soft  Musculoskeletal:         General: Normal range of motion  Skin:     General: Skin is warm and dry  Findings: Wound (left lower torso abscess healing well  No active drainage, scab present  No fluid collection or fluctuance) present  Neurological:      General: No focal deficit present  Mental Status: He is alert and oriented to person, place, and time     Psychiatric:         Behavior: Behavior normal

## 2022-07-29 NOTE — ASSESSMENT & PLAN NOTE
Physical exam revealed bilateral mildly erythematous TMs, with slight bulging and clear effusion noted   No blood or suspicion for infection     - He does have allergies, which are worsened by daily exposure to grasses as a   - Reviewed options and advised him to try Flonase intranasally one squirt per nostril daily  - Claritin 10 mg daily

## 2022-08-08 ENCOUNTER — OFFICE VISIT (OUTPATIENT)
Dept: FAMILY MEDICINE CLINIC | Facility: CLINIC | Age: 47
End: 2022-08-08

## 2022-08-08 VITALS
SYSTOLIC BLOOD PRESSURE: 120 MMHG | TEMPERATURE: 98.2 F | HEART RATE: 67 BPM | RESPIRATION RATE: 20 BRPM | WEIGHT: 218.4 LBS | HEIGHT: 66 IN | BODY MASS INDEX: 35.1 KG/M2 | OXYGEN SATURATION: 98 % | DIASTOLIC BLOOD PRESSURE: 77 MMHG

## 2022-08-08 DIAGNOSIS — T63.421A FIRE ANT BITE, ACCIDENTAL OR UNINTENTIONAL, INITIAL ENCOUNTER: Primary | ICD-10-CM

## 2022-08-08 PROCEDURE — 99213 OFFICE O/P EST LOW 20 MIN: CPT | Performed by: FAMILY MEDICINE

## 2022-08-08 NOTE — PROGRESS NOTES
CLINIC VISIT NOTE - 5 [facility]  49 y o  male MRN: [id number]  Encounter: [id number],  Primary Care Provider: Ying Zamora MD      Date: 08/08/22    Assessments & Plans:     Problem List Items Addressed This Visit        Other    Fire ant bite - Primary     Reports he got bit yesterday on the left lower abdomen and developed erythema, inflammation around the site  He searched the house and found the ants on the his cough/rug  Currently replacing the furniture  Tender and itching, but otherwise no discharge  Physical exam shows 2 inch erythematous rash but no drainage fluid palpated (see under media)  · Will hold on antibiotics and treat symptomatically   · Recommended hydrocortisone cream which patient already has and continue Claritin   · Keep area clean with soap and water  · Return precautions discussed               Patient Active Problem List   Diagnosis    Wound check, abscess    Asthma due to environmental allergies    Fire ant bite         Recent Visits:     Recent Visits  No visits were found meeting these conditions  Showing recent visits within past 7 days and meeting all other requirements  Today's Visits  Date Type Provider Dept   08/08/22 Office Visit Ying Zamora MD [facility] today's visits and meeting all other requirements  Future Appointments  No visits were found meeting these conditions  Showing future appointments within next 150 days and meeting all other requirements      Subjective:     Chief Complaint   Patient presents with    Abscess     Fire ant bite, happened yesterday  Patient is requesting abx       HPI:  49 YOM presenting for ant-bite on the left lower abdomen  Patient reports he got bit last night and he developed erythema, tenderness and pruritis around the region  He thought it was initially occupational exposure related because he is a    He was previously evaluated in the ED and the office after developing cellulitis and abscess after a different insect bite  When he got home, he searched around and found that his cough and rug was infested with fire ants  He states he has multiple children and they leave a lot of food/candy lying around on the furniture  He is currently getting the furniture removed and replaced  He denies any drainage otherwise and is asking if antibiotics are an option  He has been cleaning the area and applying topical antibiotics  Review of System:     Review of systems was reviewed and negative unless stated above in HPI/24-hour events     History:   History reviewed  No pertinent past medical history  History reviewed  No pertinent surgical history  Social History   Social History     Substance and Sexual Activity   Alcohol Use Not Currently     Social History     Substance and Sexual Activity   Drug Use Yes    Types: Marijuana     Social History     Tobacco Use   Smoking Status Former Smoker    Packs/day: 0 20    Types: Cigarettes    Quit date: 2021    Years since quittin 1   Smokeless Tobacco Never Used       Medications & Allergies:   No Known Allergies    PTA Medications:  Current Outpatient Medications on File Prior to Visit   Medication Sig Dispense Refill    fluticasone (FLONASE) 50 mcg/act nasal spray 1 spray into each nostril daily 11 1 mL 3    loratadine (CLARITIN) 10 mg tablet Take 1 tablet (10 mg total) by mouth daily 60 tablet 2    Poison Ivy Treatments (ZANFEL) MISC Apply 1 application topically 3 (three) times a day as needed (for pruritis) (Patient not taking: No sig reported) 1 Tube 0    predniSONE 10 mg tablet 5 tab once a day for 2 days, 4 tab once a day for 3 days, 3 tab once a day for 3 days, 2 tab once a day for 3 days, 1 tab once a day for 3 days (Patient not taking: No sig reported) 40 tablet 0     No current facility-administered medications on file prior to visit         Objective & Vitals:   Vitals: /77 (BP Location: Left arm, Patient Position: Sitting, Cuff Size: Large)   Pulse 67   Temp 98 2 °F (36 8 °C) (Temporal)   Resp 20   Ht 5' 6" (1 676 m)   Wt 99 1 kg (218 lb 6 4 oz)   SpO2 98%   BMI 35 25 kg/m²        Physical Exam:     Physical Exam  Vitals reviewed  Constitutional:       General: He is not in acute distress  Appearance: Normal appearance  He is not ill-appearing  HENT:      Head: Normocephalic and atraumatic  Nose: Nose normal    Eyes:      Extraocular Movements: Extraocular movements intact  Conjunctiva/sclera: Conjunctivae normal    Cardiovascular:      Rate and Rhythm: Normal rate and regular rhythm  Heart sounds: No murmur heard  Pulmonary:      Effort: Pulmonary effort is normal  No respiratory distress  Breath sounds: Normal breath sounds  Abdominal:      General: Abdomen is flat  Bowel sounds are normal       Palpations: Abdomen is soft  Tenderness: There is no abdominal tenderness  Musculoskeletal:         General: No tenderness  Normal range of motion  Cervical back: Normal range of motion and neck supple  Skin:     General: Skin is warm and dry  Findings: Rash (2 inch erythematous rash on left lower abdomen, no drainage or fluid ) present  Neurological:      Mental Status: He is alert and oriented to person, place, and time  Psychiatric:         Mood and Affect: Mood normal          Behavior: Behavior normal            Future Labs & Appointments:     FUTURE LABS:      FUTURE CONFIRMED APPOINTMENTS:  Future Appointments   Date Time Provider Ozzie Valera   8/26/2022  8:30 AM MD Nelia [address]  [address]       Fernando Nino MD  PGY-2, Family Medicine  08/08/22  5:43 PM      Dear reader, please be aware that portions of my note contain dictated text  I have done my best to proof-read this note prior to signing  However, there may be occasional unnoticed errors pertaining to "sound-alike" words and/or grammar during my dictation process   If there is any words or information that is unclear or appears erroneous, please kindly let me know and I will clarify and/or addend my notes accordingly  Thank you for your understanding

## 2022-08-08 NOTE — ASSESSMENT & PLAN NOTE
Reports he got bit yesterday on the left lower abdomen and developed erythema, inflammation around the site  He searched the house and found the ants on the his cough/rug  Currently replacing the furniture  Tender and itching, but otherwise no discharge     Physical exam shows 2 inch erythematous rash but no drainage fluid palpated (see under media)  · Will hold on antibiotics and treat symptomatically   · Recommended hydrocortisone cream which patient already has and continue Claritin   · Keep area clean with soap and water  · Return precautions discussed

## 2022-08-10 ENCOUNTER — HOSPITAL ENCOUNTER (EMERGENCY)
Facility: HOSPITAL | Age: 47
Discharge: HOME | End: 2022-08-10
Attending: EMERGENCY MEDICINE
Payer: COMMERCIAL

## 2022-08-10 VITALS
SYSTOLIC BLOOD PRESSURE: 123 MMHG | RESPIRATION RATE: 16 BRPM | DIASTOLIC BLOOD PRESSURE: 78 MMHG | HEART RATE: 50 BPM | TEMPERATURE: 97.6 F | OXYGEN SATURATION: 100 %

## 2022-08-10 DIAGNOSIS — L03.90 CELLULITIS: Primary | ICD-10-CM

## 2022-08-10 PROCEDURE — 99284 EMERGENCY DEPT VISIT MOD MDM: CPT | Performed by: EMERGENCY MEDICINE

## 2022-08-10 PROCEDURE — 99281 EMR DPT VST MAYX REQ PHY/QHP: CPT

## 2022-08-10 RX ORDER — CEPHALEXIN 250 MG/1
500 CAPSULE ORAL ONCE
Status: COMPLETED | OUTPATIENT
Start: 2022-08-10 | End: 2022-08-10

## 2022-08-10 RX ORDER — SULFAMETHOXAZOLE AND TRIMETHOPRIM 800; 160 MG/1; MG/1
1 TABLET ORAL ONCE
Status: COMPLETED | OUTPATIENT
Start: 2022-08-10 | End: 2022-08-10

## 2022-08-10 RX ORDER — CEPHALEXIN 250 MG/1
500 CAPSULE ORAL 4 TIMES DAILY
Qty: 80 CAPSULE | Refills: 0 | Status: SHIPPED | OUTPATIENT
Start: 2022-08-10 | End: 2022-08-20

## 2022-08-10 RX ORDER — SULFAMETHOXAZOLE AND TRIMETHOPRIM 800; 160 MG/1; MG/1
1 TABLET ORAL EVERY 12 HOURS SCHEDULED
Qty: 20 TABLET | Refills: 0 | Status: SHIPPED | OUTPATIENT
Start: 2022-08-10 | End: 2022-08-20

## 2022-08-10 RX ADMIN — CEPHALEXIN 500 MG: 250 CAPSULE ORAL at 22:09

## 2022-08-10 RX ADMIN — SULFAMETHOXAZOLE AND TRIMETHOPRIM 1 TABLET: 800; 160 TABLET ORAL at 22:09

## 2022-08-13 NOTE — ED PROVIDER NOTES
History  Chief Complaint   Patient presents with    Insect Bite     Pt bit by ant on abdomen 4 days ago and states he is allergic to them  Hx of the same  He was seen at urgent care and wasn't given any medication  Sight has increased redness, swollen and painful  Pt took tylenol for pain  -SOB/CP  HPI     Patient is a pleasant 49 yom who presents with a suspected ant bite to the left abdomen  There appears to be some surrounding erythema, possible cellulitis  No f/c/s  No vomiting  No crepitus  This has been present for 4 days  MDM pleasant well appearing 49 yom, will treat as cellulitis  REVIEW OF SYSTEMS  Positive for erythema to skin  All other systems reviewed and are negative unless noted in this section or otherwise in the chart  Physical Exam  Vitals and nursing note reviewed  Constitutional:    Appearance:  Patient is well-developed  No diaphoresis  HENT:   Head: Normocephalic and atraumatic  Right Ear: External ear normal    Left Ear: External ear normal    Nose: No congestion  Eyes:   Conjunctiva/sclera: Conjunctivae normal    Right eye: No discharge  Left eye: No discharge  Extraocular Movements: Extraocular movements intact  Neck:   Vascular: No JVD  Trachea: No tracheal deviation  Cardiovascular:   Rate and Rhythm: Normal rate and regular rhythm  Heart sounds: Normal heart sounds  Pulmonary:   Effort: Pulmonary effort is normal  No respiratory distress  Breath sounds: No wheezing or rales  Abdominal:   Palpations: Abdomen is soft  Tenderness: There is no abdominal tenderness  There is no guarding or rebound  Musculoskeletal:      General: No tenderness  Cervical back: Normal range of motion and neck supple  Skin:  General: Skin is warm and dry  Findings: + erythema to abdomen over possible ant bite site  Neurological:   General: No focal deficit present  Mental Status: Alert and oriented to person, place, and time  Motor: No weakness  Psychiatric:      Behavior: Behavior normal       Thought Content: Thought content normal                              Prior to Admission Medications   Prescriptions Last Dose Informant Patient Reported? Taking? Poison Ivy Treatments (ZANFEL) MISC  Self No No   Sig: Apply 1 application topically 3 (three) times a day as needed (for pruritis)   Patient not taking: No sig reported   fluticasone (FLONASE) 50 mcg/act nasal spray  Self No No   Si spray into each nostril daily   loratadine (CLARITIN) 10 mg tablet  Self No No   Sig: Take 1 tablet (10 mg total) by mouth daily   predniSONE 10 mg tablet  Self No No   Si tab once a day for 2 days, 4 tab once a day for 3 days, 3 tab once a day for 3 days, 2 tab once a day for 3 days, 1 tab once a day for 3 days   Patient not taking: No sig reported      Facility-Administered Medications: None       History reviewed  No pertinent past medical history  History reviewed  No pertinent surgical history  History reviewed  No pertinent family history  I have reviewed and agree with the history as documented      E-Cigarette/Vaping    E-Cigarette Use Current Every Day User      E-Cigarette/Vaping Substances    Nicotine Yes     THC No     CBD Yes      Social History     Tobacco Use    Smoking status: Former Smoker     Packs/day: 0 20     Types: Cigarettes     Quit date: 2021     Years since quittin 1    Smokeless tobacco: Never Used   Vaping Use    Vaping Use: Every day    Substances: Nicotine, CBD   Substance Use Topics    Alcohol use: Not Currently    Drug use: Yes     Types: Marijuana       Review of Systems    Physical Exam  Physical Exam    Vital Signs  ED Triage Vitals [08/10/22 2116]   Temperature Pulse Respirations Blood Pressure SpO2   97 6 °F (36 4 °C) (!) 50 16 123/78 100 %      Temp Source Heart Rate Source Patient Position - Orthostatic VS BP Location FiO2 (%)   Oral Monitor -- Left arm --      Pain Score       5 Vitals:    08/10/22 2116   BP: 123/78   Pulse: (!) 50         Visual Acuity      ED Medications  Medications   cephalexin (KEFLEX) capsule 500 mg (500 mg Oral Given 8/10/22 2209)   sulfamethoxazole-trimethoprim (BACTRIM DS) 800-160 mg per tablet 1 tablet (1 tablet Oral Given 8/10/22 2209)       Diagnostic Studies  Results Reviewed     None                 No orders to display              Procedures  Procedures         ED Course                                             MDM    Disposition  Final diagnoses:   Cellulitis     Time reflects when diagnosis was documented in both MDM as applicable and the Disposition within this note     Time User Action Codes Description Comment    8/10/2022 10:03  [address] [[id number]] Cellulitis       ED Disposition     ED Disposition   Discharge    Condition   Stable    Date/Time   Wed Aug 10, 2022 10:03 PM    Comment   Garret Parada discharge to home/self care                 Follow-up Information     Follow up With Specialties Details Why Contact Info    Primary Care Doctor  Schedule an appointment as soon as possible for a visit in 2 days            Discharge Medication List as of 8/10/2022 10:05 PM      START taking these medications    Details   cephalexin (KEFLEX) 250 mg capsule Take 2 capsules (500 mg total) by mouth 4 (four) times a day for 10 days, Starting Wed 8/10/2022, Until Sat 8/20/2022, Print      sulfamethoxazole-trimethoprim (BACTRIM DS) 800-160 mg per tablet Take 1 tablet by mouth every 12 (twelve) hours for 10 days, Starting Wed 8/10/2022, Until Sat 8/20/2022, Print         CONTINUE these medications which have NOT CHANGED    Details   fluticasone (FLONASE) 50 mcg/act nasal spray 1 spray into each nostril daily, Starting Fri 7/29/2022, Normal      loratadine (CLARITIN) 10 mg tablet Take 1 tablet (10 mg total) by mouth daily, Starting Fri 7/29/2022, Normal      Poison Ivy Treatments (ZANFEL) MISC Apply 1 application topically 3 (three) times a day as needed (for pruritis), Starting Wed 7/15/2020, Print      predniSONE 10 mg tablet 5 tab once a day for 2 days, 4 tab once a day for 3 days, 3 tab once a day for 3 days, 2 tab once a day for 3 days, 1 tab once a day for 3 days, Print             No discharge procedures on file      PDMP Review     None          ED Provider  Electronically Signed by           Gill Mario MD  08/12/22 9077

## 2022-08-26 ENCOUNTER — OFFICE VISIT (OUTPATIENT)
Dept: FAMILY MEDICINE CLINIC | Facility: CLINIC | Age: 47
End: 2022-08-26

## 2022-08-26 VITALS
HEART RATE: 60 BPM | BODY MASS INDEX: 32.49 KG/M2 | HEIGHT: 68 IN | SYSTOLIC BLOOD PRESSURE: 119 MMHG | OXYGEN SATURATION: 98 % | DIASTOLIC BLOOD PRESSURE: 77 MMHG | TEMPERATURE: 98.4 F | WEIGHT: 214.4 LBS

## 2022-08-26 DIAGNOSIS — Z12.11 SCREENING FOR COLORECTAL CANCER: ICD-10-CM

## 2022-08-26 DIAGNOSIS — Z23 ENCOUNTER FOR IMMUNIZATION: ICD-10-CM

## 2022-08-26 DIAGNOSIS — Z11.4 SCREENING FOR HIV (HUMAN IMMUNODEFICIENCY VIRUS): ICD-10-CM

## 2022-08-26 DIAGNOSIS — Z12.12 SCREENING FOR COLORECTAL CANCER: ICD-10-CM

## 2022-08-26 DIAGNOSIS — Z00.00 ANNUAL PHYSICAL EXAM: Primary | ICD-10-CM

## 2022-08-26 DIAGNOSIS — Z11.59 NEED FOR HEPATITIS C SCREENING TEST: ICD-10-CM

## 2022-08-26 PROCEDURE — 99396 PREV VISIT EST AGE 40-64: CPT | Performed by: FAMILY MEDICINE

## 2022-08-26 PROCEDURE — 90715 TDAP VACCINE 7 YRS/> IM: CPT | Performed by: FAMILY MEDICINE

## 2022-08-26 PROCEDURE — 90471 IMMUNIZATION ADMIN: CPT | Performed by: FAMILY MEDICINE

## 2022-08-26 RX ORDER — CEPHALEXIN 500 MG/1
CAPSULE ORAL
COMMUNITY
Start: 2022-08-11

## 2022-08-26 NOTE — PATIENT INSTRUCTIONS

## 2022-08-26 NOTE — PROGRESS NOTES
[facility] [place]    NAME: Anthony Benitez  AGE: 49 y o  SEX: male  : 1975     DATE: 2022     Assessment and Plan:     Problem List Items Addressed This Visit        Other    Screening for colorectal cancer     The patient endorses a history of colorectal cancer in his grandmother which was discovered late and he reported that she passed due to these circumstances      -We discussed the importance of screening which the patient was receptive to and he will schedule a colonoscopy in the future      -Standing order placed  Relevant Orders    Ambulatory referral for Colonoscopy    Annual physical exam - Primary     Refer to note for history  49year old male presenting for physical  BMI 32 7 with /77  PMH: Asthma, recurrent abscess/cellulitis from insect bites  Fam Hx: Hx of colon cancer in grandmother (late diagnosis)  Social Hx: Denies Tobacco, EtOH, substance use  Quit smoking 1 year ago  PHQ-2 score: negative score of 0 (2022)  · Due for Colonoscopy - referral placed   · Vaccines: - Unclear if his last TDaP was given approximately 8-9 years, unclear  Given patient is a , received TDaP today  · Follow-up in 1 year   · Discussed needs for screening labs such as BMP, CBC, TSH, HIV, and Hep C   · Patient understands the necessity for these recommendations and is receptive               Other Visit Diagnoses     Screening for HIV (human immunodeficiency virus)        Relevant Orders    HIV 1/2 Antigen/Antibody (4th Generation) w Reflex [facility]    Need for hepatitis C screening test        Relevant Orders    Hepatitis C antibody    Encounter for immunization        Relevant Orders    TDAP VACCINE GREATER THAN OR EQUAL TO 6YO IM (Completed)    BMI 32 0-32 9,adult        Relevant Orders    Lipid panel    TSH, 3rd generation with Free T4 reflex    Basic metabolic panel Immunizations and preventive care screenings were discussed with patient today  Appropriate education was printed on patient's after visit summary  Counseling:  Dental Health: discussed importance of regular tooth brushing, flossing, and dental visits  BMI Counseling: Body mass index is 32 7 kg/m²  The BMI is above normal  Nutrition recommendations include encouraging healthy choices of fruits and vegetables and consuming healthier snacks  Rationale for BMI follow-up plan is due to patient being overweight or obese  Return in 1 year (on 8/26/2023) for Annual physical      Chief Complaint:     Chief Complaint   Patient presents with    Annual Exam      History of Present Illness:     Adult Annual Physical   Patient here for a comprehensive physical exam  The patient reports he had intercourse with his wife while she had a yeast infection  He developed an itch and red spots on his penis yesterday, symptoms are improving/resolved  He reports having heartburn from spicy foods  No other concerns  Diet and Physical Activity  · Diet/Nutrition: Reports losing around 70 lbs > 6 months after changing diet and with exercise from job  He reports that his diet doesn't include many fruits or vegetables  he skips breakfast and drinks teas and protein shakes  Reports eating a lot of doughnuts and pizza once every two weeks      · Exercise: He is a  and primarily gets his workouts from working         Depression Screening  PHQ-2/9 Depression Screening         General Health  · Sleep: gets 4-6 hours of sleep on average  · Hearing: Feels that he has some hearing loss 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 loud machines   · Vision: wears glasses and far-sighted  · Dental: no dental visits for >1 year, brushes teeth once daily and flosses teeth occasionally   Health  · Symptoms include: urinary frequency     Review of Systems:     Review of Systems   Constitutional: Negative for fatigue and fever     HENT: Positive for ear pain and hearing loss (occupational exposure to loud noises)  Negative for congestion, facial swelling, sinus pain and sore throat  Eyes: Negative for pain and redness  Respiratory: Negative for apnea, cough and chest tightness  Cardiovascular: Negative for chest pain and palpitations  Gastrointestinal: Negative for diarrhea, nausea and vomiting  Genitourinary: Negative for difficulty urinating and dysuria  Skin: Negative for color change and rash  Neurological: Negative for dizziness, seizures and headaches  Hematological: Negative for adenopathy  Psychiatric/Behavioral: Negative for behavioral problems  Past Medical History:     History reviewed  No pertinent past medical history  Past Surgical History:     History reviewed  No pertinent surgical history     Family History:     Family History   Problem Relation Age of Onset    Anxiety disorder Mother     Hypertension Father    Nito Abt Diabetes Father     Hypertension Sister     Diabetes Sister       Social History:     Social History     Socioeconomic History    Marital status: /Civil Union     Spouse name: None    Number of children: None    Years of education: None    Highest education level: None   Occupational History    None   Tobacco Use    Smoking status: Former Smoker     Packs/day: 0 20     Types: Cigarettes     Quit date: 2021     Years since quittin 1    Smokeless tobacco: Never Used   Vaping Use    Vaping Use: Every day    Substances: THC, CBD, Flavoring   Substance and Sexual Activity    Alcohol use: Not Currently    Drug use: Yes     Types: Marijuana    Sexual activity: None   Other Topics Concern    None   Social History Narrative    None     Social Determinants of Health     Financial Resource Strain: High Risk    Difficulty of Paying Living Expenses: Hard   Food Insecurity: No Food Insecurity    Worried About Running Out of Food in the Last Year: Never true    [place] of Food in the Last Year: Never true   Transportation Needs: No Transportation Needs    Lack of Transportation (Medical): No    Lack of Transportation (Non-Medical):  No   Physical Activity: Inactive    Days of Exercise per Week: 0 days    Minutes of Exercise per Session: 0 min   Stress: No Stress Concern Present    Feeling of Stress : Not at all   Social Connections: Socially Isolated    Frequency of Communication with Friends and Family: More than three times a week    Frequency of Social Gatherings with Friends and Family: Twice a week    Attends Temple Services: Never    Active Member of Clubs or Organizations: No    Attends Club or Organization Meetings: Never    Marital Status: Never    Intimate Partner Violence: Not At Risk    Fear of Current or Ex-Partner: No    Emotionally Abused: No    Physically Abused: No    Sexually Abused: No   Housing Stability: [address] Unable to Pay for Housing in the Last Year: No    Number of [place] in the Last Year: 1    Unstable Housing in the Last Year: No      Current Medications:     Current Outpatient Medications   Medication Sig Dispense Refill    fluticasone (FLONASE) 50 mcg/act nasal spray 1 spray into each nostril daily (Patient taking differently: 1 spray into each nostril as needed) 11 1 mL 3    loratadine (CLARITIN) 10 mg tablet Take 1 tablet (10 mg total) by mouth daily (Patient taking differently: Take 10 mg by mouth as needed) 60 tablet 2    cephalexin (KEFLEX) 500 mg capsule TAKE 1 CAPSULE BY MOUTH 4 TIMES A DAY FOR 10 DAYS (Patient not taking: Reported on 8/26/2022)      Poison Ivy Treatments (ZANFEL) MISC Apply 1 application topically 3 (three) times a day as needed (for pruritis) (Patient not taking: No sig reported) 1 Tube 0    predniSONE 10 mg tablet 5 tab once a day for 2 days, 4 tab once a day for 3 days, 3 tab once a day for 3 days, 2 tab once a day for 3 days, 1 tab once a day for 3 days (Patient not taking: No sig reported) 40 tablet 0     No current facility-administered medications for this visit  Allergies:     No Known Allergies   Physical Exam:     /77 (BP Location: Left arm, Patient Position: Sitting, Cuff Size: Large)   Pulse 60   Temp 98 4 °F (36 9 °C) (Temporal)   Ht 5' 7 9" (1 725 m)   Wt 97 3 kg (214 lb 6 4 oz)   SpO2 98%   BMI 32 70 kg/m²     Physical Exam  Constitutional:       General: He is not in acute distress  Appearance: Normal appearance  HENT:      Head: Normocephalic and atraumatic  Nose: Nose normal  No congestion or rhinorrhea  Mouth/Throat:      Mouth: Mucous membranes are moist       Pharynx: Oropharynx is clear  Eyes:      General:         Right eye: No discharge  Left eye: No discharge  Conjunctiva/sclera: Conjunctivae normal    Cardiovascular:      Rate and Rhythm: Normal rate and regular rhythm  Pulses: Normal pulses  Heart sounds: Normal heart sounds  No murmur heard  No gallop  Pulmonary:      Effort: Pulmonary effort is normal  No respiratory distress  Breath sounds: Normal breath sounds  No wheezing or rales  Abdominal:      General: Abdomen is flat  Bowel sounds are normal       Palpations: Abdomen is soft  Skin:     General: Skin is warm and dry  Neurological:      General: No focal deficit present  Mental Status: He is alert and oriented to person, place, and time        Gait: Gait normal    Psychiatric:         Mood and Affect: Mood normal          Behavior: Behavior normal           Dionne Hercules MD  [id number] [place]

## 2022-08-26 NOTE — ASSESSMENT & PLAN NOTE
Refer to note for history  49year old male presenting for physical  BMI 32 7 with /77  PMH: Asthma, recurrent abscess/cellulitis from insect bites  Fam Hx: Hx of colon cancer in grandmother (late diagnosis)  Social Hx: Denies Tobacco, EtOH, substance use  Quit smoking 1 year ago  PHQ-2 score: negative score of 0 (7/29/2022)  · Due for Colonoscopy - referral placed   · Vaccines: - Unclear if his last TDaP was given approximately 8-9 years, unclear  Given patient is a , received TDaP today  · Follow-up in 1 year   · Discussed needs for screening labs such as BMP, CBC, TSH, HIV, and Hep C   · Patient understands the necessity for these recommendations and is receptive

## 2022-08-26 NOTE — ASSESSMENT & PLAN NOTE
The patient endorses a history of colorectal cancer in his grandmother which was discovered late and he reported that she passed due to these circumstances      -We discussed the importance of screening which the patient was receptive to and he will schedule a colonoscopy in the future      -Standing order placed

## 2022-10-25 PROBLEM — Z12.12 SCREENING FOR COLORECTAL CANCER: Status: RESOLVED | Noted: 2022-08-26 | Resolved: 2022-10-25

## 2022-10-25 PROBLEM — Z12.11 SCREENING FOR COLORECTAL CANCER: Status: RESOLVED | Noted: 2022-08-26 | Resolved: 2022-10-25

## 2022-12-07 ENCOUNTER — APPOINTMENT (EMERGENCY)
Dept: RADIOLOGY | Facility: HOSPITAL | Age: 47
End: 2022-12-07

## 2022-12-07 ENCOUNTER — HOSPITAL ENCOUNTER (EMERGENCY)
Facility: HOSPITAL | Age: 47
Discharge: HOME | End: 2022-12-07
Attending: EMERGENCY MEDICINE

## 2022-12-07 VITALS
OXYGEN SATURATION: 96 % | SYSTOLIC BLOOD PRESSURE: 135 MMHG | TEMPERATURE: 98.5 F | HEART RATE: 78 BPM | BODY MASS INDEX: 33.59 KG/M2 | WEIGHT: 220.24 LBS | RESPIRATION RATE: 16 BRPM | DIASTOLIC BLOOD PRESSURE: 67 MMHG

## 2022-12-07 DIAGNOSIS — R07.81 RIB PAIN ON LEFT SIDE: Primary | ICD-10-CM

## 2022-12-07 DIAGNOSIS — R07.89 ATYPICAL CHEST PAIN: ICD-10-CM

## 2022-12-07 LAB
ALBUMIN SERPL BCP-MCNC: 4.1 G/DL (ref 3.5–5)
ALP SERPL-CCNC: 46 U/L (ref 34–104)
ALT SERPL W P-5'-P-CCNC: 19 U/L (ref 7–52)
ANION GAP SERPL CALCULATED.3IONS-SCNC: 5 MMOL/L (ref 4–13)
AST SERPL W P-5'-P-CCNC: 17 U/L (ref 13–39)
ATRIAL RATE: 76 BPM
BASOPHILS # BLD AUTO: 0.07 THOUSANDS/ÂΜL (ref 0–0.1)
BASOPHILS NFR BLD AUTO: 1 % (ref 0–1)
BILIRUB SERPL-MCNC: 0.54 MG/DL (ref 0.2–1)
BUN SERPL-MCNC: 18 MG/DL (ref 5–25)
CALCIUM SERPL-MCNC: 8.5 MG/DL (ref 8.4–10.2)
CARDIAC TROPONIN I PNL SERPL HS: 3 NG/L
CHLORIDE SERPL-SCNC: 109 MMOL/L (ref 96–108)
CO2 SERPL-SCNC: 22 MMOL/L (ref 21–32)
CREAT SERPL-MCNC: 0.81 MG/DL (ref 0.6–1.3)
EOSINOPHIL # BLD AUTO: 0.19 THOUSAND/ÂΜL (ref 0–0.61)
EOSINOPHIL NFR BLD AUTO: 3 % (ref 0–6)
ERYTHROCYTE [DISTWIDTH] IN BLOOD BY AUTOMATED COUNT: 13.7 % (ref 11.6–15.1)
GFR SERPL CREATININE-BSD FRML MDRD: 105 ML/MIN/1.73SQ M
GLUCOSE SERPL-MCNC: 109 MG/DL (ref 65–140)
HCT VFR BLD AUTO: 46.8 % (ref 36.5–49.3)
HGB BLD-MCNC: 15.1 G/DL (ref 12–17)
IMM GRANULOCYTES # BLD AUTO: 0.04 THOUSAND/UL (ref 0–0.2)
IMM GRANULOCYTES NFR BLD AUTO: 1 % (ref 0–2)
LYMPHOCYTES # BLD AUTO: 2.6 THOUSANDS/ÂΜL (ref 0.6–4.47)
LYMPHOCYTES NFR BLD AUTO: 37 % (ref 14–44)
MCH RBC QN AUTO: 27.8 PG (ref 26.8–34.3)
MCHC RBC AUTO-ENTMCNC: 32.3 G/DL (ref 31.4–37.4)
MCV RBC AUTO: 86 FL (ref 82–98)
MONOCYTES # BLD AUTO: 0.49 THOUSAND/ÂΜL (ref 0.17–1.22)
MONOCYTES NFR BLD AUTO: 7 % (ref 4–12)
NEUTROPHILS # BLD AUTO: 3.66 THOUSANDS/ÂΜL (ref 1.85–7.62)
NEUTS SEG NFR BLD AUTO: 51 % (ref 43–75)
NRBC BLD AUTO-RTO: 0 /100 WBCS
P AXIS: 74 DEGREES
PLATELET # BLD AUTO: 169 THOUSANDS/UL (ref 149–390)
PMV BLD AUTO: 11.8 FL (ref 8.9–12.7)
POTASSIUM SERPL-SCNC: 3.9 MMOL/L (ref 3.5–5.3)
PR INTERVAL: 164 MS
PROT SERPL-MCNC: 7 G/DL (ref 6.4–8.4)
QRS AXIS: 50 DEGREES
QRSD INTERVAL: 74 MS
QT INTERVAL: 362 MS
QTC INTERVAL: 407 MS
RBC # BLD AUTO: 5.43 MILLION/UL (ref 3.88–5.62)
SODIUM SERPL-SCNC: 136 MMOL/L (ref 135–147)
T WAVE AXIS: 52 DEGREES
VENTRICULAR RATE: 76 BPM
WBC # BLD AUTO: 7.05 THOUSAND/UL (ref 4.31–10.16)

## 2022-12-07 RX ORDER — PANTOPRAZOLE SODIUM 40 MG/1
40 TABLET, DELAYED RELEASE ORAL DAILY
Qty: 30 TABLET | Refills: 0 | Status: SHIPPED | OUTPATIENT
Start: 2022-12-07 | End: 2023-01-06

## 2022-12-07 NOTE — ED PROVIDER NOTES
History  Chief Complaint   Patient presents with   • Rib Pain     Pt c/o L sided lower rib cage/chest pain since yesterday  States feels like gas pain but isn't going away  Also concerned because BP pta was 140/100  Pleasant 79-year-old male presenting with left lower chest/rib pain since yesterday  Patient states that he has also been very gassy and has had some burning after eating  Patient states that he has a strong family history of cardiac diseases and heart attacks and was concerned with this pain that was not going away  She denies any chest pain worsening upon exertion or any shortness of breath  Denies any nausea or vomiting or change in this pain with food or fluids  Denies any abdominal pain, back pain, fevers, chills, numbness or tingling in the extremities, diarrhea, constipation, or any other symptoms at this time  Prior to Admission Medications   Prescriptions Last Dose Informant Patient Reported? Taking? Poison Ivy Treatments (ZANFEL) MISC   No No   Sig: Apply 1 application topically 3 (three) times a day as needed (for pruritis)   Patient not taking: No sig reported   cephalexin (KEFLEX) 500 mg capsule   Yes No   Sig: TAKE 1 CAPSULE BY MOUTH 4 TIMES A DAY FOR 10 DAYS   Patient not taking: Reported on 2022   fluticasone (FLONASE) 50 mcg/act nasal spray   No No   Si spray into each nostril daily   Patient taking differently: 1 spray into each nostril as needed   loratadine (CLARITIN) 10 mg tablet   No No   Sig: Take 1 tablet (10 mg total) by mouth daily   Patient taking differently: Take 10 mg by mouth as needed   predniSONE 10 mg tablet   No No   Si tab once a day for 2 days, 4 tab once a day for 3 days, 3 tab once a day for 3 days, 2 tab once a day for 3 days, 1 tab once a day for 3 days   Patient not taking: No sig reported      Facility-Administered Medications: None       No past medical history on file  No past surgical history on file      Family History Problem Relation Age of Onset   • Anxiety disorder Mother    • Hypertension Father    • Diabetes Father    • Hypertension Sister    • Diabetes Sister      I have reviewed and agree with the history as documented  E-Cigarette/Vaping   • E-Cigarette Use Current Every Day User      E-Cigarette/Vaping Substances   • Nicotine No    • THC Yes    • CBD Yes    • Flavoring Yes    • Other No      Social History     Tobacco Use   • Smoking status: Former     Packs/day: 0 20     Types: Cigarettes     Quit date: 2021     Years since quittin 4   • Smokeless tobacco: Never   Vaping Use   • Vaping Use: Every day   • Substances: THC, CBD, Flavoring   Substance Use Topics   • Alcohol use: Not Currently   • Drug use: Yes     Types: Marijuana       Review of Systems   Constitutional: Negative for chills and fever  HENT: Negative for ear pain and sore throat  Eyes: Negative for pain and visual disturbance  Respiratory: Negative for apnea, cough, choking, chest tightness, shortness of breath, wheezing and stridor  Cardiovascular: Positive for chest pain  Negative for palpitations  Gastrointestinal: Negative for abdominal pain, anal bleeding, constipation, diarrhea, nausea and vomiting  Burping   Genitourinary: Negative for dysuria, flank pain and hematuria  Musculoskeletal: Negative for arthralgias and back pain  Skin: Negative for color change and rash  Neurological: Negative for dizziness, seizures, syncope, numbness and headaches  All other systems reviewed and are negative  Physical Exam  Physical Exam  Vitals and nursing note reviewed  Constitutional:       General: He is not in acute distress  Appearance: He is well-developed  He is not ill-appearing  HENT:      Head: Normocephalic and atraumatic  Eyes:      Conjunctiva/sclera: Conjunctivae normal    Cardiovascular:      Rate and Rhythm: Normal rate and regular rhythm  Pulses: Normal pulses        Heart sounds: Normal heart sounds  No murmur heard  No friction rub  No gallop  Pulmonary:      Effort: Pulmonary effort is normal  No respiratory distress  Breath sounds: Normal breath sounds  No stridor  No wheezing, rhonchi or rales  Chest:      Chest wall: No tenderness  Abdominal:      Palpations: Abdomen is soft  Tenderness: There is no abdominal tenderness  There is no right CVA tenderness or left CVA tenderness  Musculoskeletal:         General: Tenderness (rib area, left side) present  No swelling, deformity or signs of injury  Cervical back: Neck supple  No rigidity  Right lower leg: No edema  Left lower leg: No edema  Lymphadenopathy:      Cervical: No cervical adenopathy  Skin:     General: Skin is warm and dry  Capillary Refill: Capillary refill takes less than 2 seconds  Coloration: Skin is not jaundiced or pale  Findings: No bruising, erythema, lesion or rash  Neurological:      Mental Status: He is alert     Psychiatric:         Mood and Affect: Mood normal          Vital Signs  ED Triage Vitals [12/07/22 0835]   Temperature Pulse Respirations Blood Pressure SpO2   98 5 °F (36 9 °C) 86 16 (!) 166/101 98 %      Temp Source Heart Rate Source Patient Position - Orthostatic VS BP Location FiO2 (%)   Oral Monitor Lying Right arm --      Pain Score       4           Vitals:    12/07/22 0835 12/07/22 0841 12/07/22 0900   BP: (!) 166/101 131/76 135/67   Pulse: 86 80 78   Patient Position - Orthostatic VS: Lying Lying Sitting         Visual Acuity      ED Medications  Medications - No data to display    Diagnostic Studies  Results Reviewed     Procedure Component Value Units Date/Time    HS Troponin 0hr (reflex protocol) [[id number]]  (Normal) Collected: 12/07/22 0841    Lab Status: Final result Specimen: Blood from Arm, Right Updated: 12/07/22 0928     hs TnI 0hr 3 ng/L     Comprehensive metabolic panel [[id number]]  (Abnormal) Collected: 12/07/22 0841    Lab Status: Final result Specimen: Blood from Arm, Right Updated: 12/07/22 0914     Sodium 136 mmol/L      Potassium 3 9 mmol/L      Chloride 109 mmol/L      CO2 22 mmol/L      ANION GAP 5 mmol/L      BUN 18 mg/dL      Creatinine 0 81 mg/dL      Glucose 109 mg/dL      Calcium 8 5 mg/dL      AST 17 U/L      ALT 19 U/L      Alkaline Phosphatase 46 U/L      Total Protein 7 0 g/dL      Albumin 4 1 g/dL      Total Bilirubin 0 54 mg/dL      eGFR 105 ml/min/1 73sq m     Narrative:      [facility] guidelines for Chronic Kidney Disease (CKD):   •  Stage 1 with normal or high GFR (GFR > 90 mL/min/1 73 square meters)  •  Stage 2 Mild CKD (GFR = 60-89 mL/min/1 73 square meters)  •  Stage 3A Moderate CKD (GFR = 45-59 mL/min/1 73 square meters)  •  Stage 3B Moderate CKD (GFR = 30-44 mL/min/1 73 square meters)  •  Stage 4 Severe CKD (GFR = 15-29 mL/min/1 73 square meters)  •  Stage 5 End Stage CKD (GFR <15 mL/min/1 73 square meters)  Note: GFR calculation is accurate only with a steady state creatinine    CBC and differential [[id number]] Collected: 12/07/22 0841    Lab Status: Final result Specimen: Blood from Arm, Right Updated: 12/07/22 0852     WBC 7 05 Thousand/uL      RBC 5 43 Million/uL      Hemoglobin 15 1 g/dL      Hematocrit 46 8 %      MCV 86 fL      MCH 27 8 pg      MCHC 32 3 g/dL      RDW 13 7 %      MPV 11 8 fL      Platelets 120 Thousands/uL      nRBC 0 /100 WBCs      Neutrophils Relative 51 %      Immat GRANS % 1 %      Lymphocytes Relative 37 %      Monocytes Relative 7 %      Eosinophils Relative 3 %      Basophils Relative 1 %      Neutrophils Absolute 3 66 Thousands/µL      Immature Grans Absolute 0 04 Thousand/uL      Lymphocytes Absolute 2 60 Thousands/µL      Monocytes Absolute 0 49 Thousand/µL      Eosinophils Absolute 0 19 Thousand/µL      Basophils Absolute 0 07 Thousands/µL                  XR chest 1 view portable   Final Result by Kilo Rodrigues MD (12/07 9905)      No acute cardiopulmonary disease  Workstation performed: [id number]                    Procedures  ECG 12 Lead Documentation Only    Date/Time: 12/7/2022 5:15 PM  Performed by: Jose Wisdom PA-C  Authorized by: Jose Wisdom PA-C     Indications / Diagnosis:  Chest pain  ECG reviewed by me, the ED Provider: yes    Patient location:  ED  Previous ECG:     Previous ECG:  Unavailable  Interpretation:     Interpretation: normal    Rate:     ECG rate:  76    ECG rate assessment: normal    Rhythm:     Rhythm: sinus rhythm    Ectopy:     Ectopy: none    QRS:     QRS axis:  Normal    QRS intervals:  Normal  Conduction:     Conduction: normal    ST segments:     ST segments:  Normal  T waves:     T waves: normal               ED Course             HEART Risk Score    Flowsheet Row Most Recent Value   Heart Score Risk Calculator    History 0 Filed at: 12/16/2022 1952   ECG 0 Filed at: 12/16/2022 1952   Age 1 Filed at: 12/16/2022 1952   Risk Factors 0 Filed at: 12/16/2022 1952   Troponin 0 Filed at: 12/16/2022 1952   HEART Score 1 Filed at: 12/16/2022 1952                                      [facility]  Number of Diagnoses or Management Options  Atypical chest pain  Rib pain on left side  Diagnosis management comments: 76-year-old male presenting with left-sided rib pain  Due to patient's family history of cardiac disease we will work patient up for this chest pain  Phone was negative as well as EKG and chest x-ray  Labs within normal limits  Patient states that the pain is mainly after he eats and feels like a burning in his chest   We will treat patient with pantoprazole and have him see primary care  Patient looks well upon time of discharge and denies any change in symptoms  Heart Score : 1  Will have patient follow up with his PCP        Disposition  Final diagnoses:   Rib pain on left side   Atypical chest pain     Time reflects when diagnosis was documented in both MDM as applicable and the Disposition within this note Time User Action Codes Description Comment    12/7/2022  9:37 AM Adrian Kaiser Add [R07 81] Rib pain on left side     12/7/2022  5:15 PM Adrian Awilda Add [R07 89] Atypical chest pain       ED Disposition     ED Disposition   Discharge    Condition   Stable    Date/Time   Wed Dec 7, 2022  9:39 AM    Comment   Stephanie Moseley discharge to home/self care  Follow-up Information     Follow up With Specialties Details Why Contact Info Additional [address] Emergency Department Emergency Medicine Go to  If symptoms worsen [address] Emergency Department, [address], [place], [place], [address]          Discharge Medication List as of 12/7/2022  9:39 AM      START taking these medications    Details   pantoprazole (PROTONIX) 40 mg tablet Take 1 tablet (40 mg total) by mouth daily, Starting Wed 12/7/2022, Until Fri 1/6/2023, Normal         CONTINUE these medications which have NOT CHANGED    Details   cephalexin (KEFLEX) 500 mg capsule TAKE 1 CAPSULE BY MOUTH 4 TIMES A DAY FOR 10 DAYS, Historical Med      fluticasone (FLONASE) 50 mcg/act nasal spray 1 spray into each nostril daily, Starting Fri 7/29/2022, Normal      loratadine (CLARITIN) 10 mg tablet Take 1 tablet (10 mg total) by mouth daily, Starting Fri 7/29/2022, Normal      Poison Ivy Treatments (ZANFEL) MISC Apply 1 application topically 3 (three) times a day as needed (for pruritis), Starting Wed 7/15/2020, Print      predniSONE 10 mg tablet 5 tab once a day for 2 days, 4 tab once a day for 3 days, 3 tab once a day for 3 days, 2 tab once a day for 3 days, 1 tab once a day for 3 days, Print             No discharge procedures on file      PDMP Review     None          ED Provider  Electronically Signed by           Arlyn Villagomez PA-C  12/07/22 733 Hamzah Moser PA-C  12/16/22 3812

## 2022-12-07 NOTE — DISCHARGE INSTRUCTIONS
While at the pharmacy, purchase Gas-X and take it as needed for gas  Take Protonix once a day   Follow up with PCP

## 2022-12-17 NOTE — ED PROVIDER NOTES
History  Chief Complaint   Patient presents with   • Rib Pain     Pt c/o L sided lower rib cage/chest pain since yesterday  States feels like gas pain but isn't going away  Also concerned because BP pta was 140/100  Pleasant 76-year-old male presenting with left lower chest/rib pain since yesterday  Patient states that he has also been very gassy and has had some burning after eating  Patient states that he has a strong family history of cardiac diseases and heart attacks and was concerned with this pain that was not going away  She denies any chest pain worsening upon exertion or any shortness of breath  Denies any nausea or vomiting or change in this pain with food or fluids  Denies any abdominal pain, back pain, fevers, chills, numbness or tingling in the extremities, diarrhea, constipation, or any other symptoms at this time  Prior to Admission Medications   Prescriptions Last Dose Informant Patient Reported? Taking? Poison Ivy Treatments (ZANFEL) MISC   No No   Sig: Apply 1 application topically 3 (three) times a day as needed (for pruritis)   Patient not taking: No sig reported   cephalexin (KEFLEX) 500 mg capsule   Yes No   Sig: TAKE 1 CAPSULE BY MOUTH 4 TIMES A DAY FOR 10 DAYS   Patient not taking: Reported on 2022   fluticasone (FLONASE) 50 mcg/act nasal spray   No No   Si spray into each nostril daily   Patient taking differently: 1 spray into each nostril as needed   loratadine (CLARITIN) 10 mg tablet   No No   Sig: Take 1 tablet (10 mg total) by mouth daily   Patient taking differently: Take 10 mg by mouth as needed   predniSONE 10 mg tablet   No No   Si tab once a day for 2 days, 4 tab once a day for 3 days, 3 tab once a day for 3 days, 2 tab once a day for 3 days, 1 tab once a day for 3 days   Patient not taking: No sig reported      Facility-Administered Medications: None       No past medical history on file  No past surgical history on file      Family History Problem Relation Age of Onset   • Anxiety disorder Mother    • Hypertension Father    • Diabetes Father    • Hypertension Sister    • Diabetes Sister      I have reviewed and agree with the history as documented  E-Cigarette/Vaping   • E-Cigarette Use Current Every Day User      E-Cigarette/Vaping Substances   • Nicotine No    • THC Yes    • CBD Yes    • Flavoring Yes    • Other No      Social History     Tobacco Use   • Smoking status: Former     Packs/day: 0 20     Types: Cigarettes     Quit date: 2021     Years since quittin 4   • Smokeless tobacco: Never   Vaping Use   • Vaping Use: Every day   • Substances: THC, CBD, Flavoring   Substance Use Topics   • Alcohol use: Not Currently   • Drug use: Yes     Types: Marijuana       Review of Systems   Constitutional: Negative for chills and fever  HENT: Negative for ear pain and sore throat  Eyes: Negative for pain and visual disturbance  Respiratory: Negative for apnea, cough, choking, chest tightness, shortness of breath, wheezing and stridor  Cardiovascular: Positive for chest pain  Negative for palpitations  Gastrointestinal: Negative for abdominal pain, anal bleeding, constipation, diarrhea, nausea and vomiting  Burping   Genitourinary: Negative for dysuria, flank pain and hematuria  Musculoskeletal: Negative for arthralgias and back pain  Skin: Negative for color change and rash  Neurological: Negative for dizziness, seizures, syncope, numbness and headaches  All other systems reviewed and are negative  Physical Exam  Physical Exam  Vitals and nursing note reviewed  Constitutional:       General: He is not in acute distress  Appearance: He is well-developed  He is not ill-appearing  HENT:      Head: Normocephalic and atraumatic  Eyes:      Conjunctiva/sclera: Conjunctivae normal    Cardiovascular:      Rate and Rhythm: Normal rate and regular rhythm  Pulses: Normal pulses        Heart sounds: Normal heart sounds  No murmur heard  No friction rub  No gallop  Pulmonary:      Effort: Pulmonary effort is normal  No respiratory distress  Breath sounds: Normal breath sounds  No stridor  No wheezing, rhonchi or rales  Chest:      Chest wall: No tenderness  Abdominal:      Palpations: Abdomen is soft  Tenderness: There is no abdominal tenderness  There is no right CVA tenderness or left CVA tenderness  Musculoskeletal:         General: Tenderness (rib area, left side) present  No swelling, deformity or signs of injury  Cervical back: Neck supple  No rigidity  Right lower leg: No edema  Left lower leg: No edema  Lymphadenopathy:      Cervical: No cervical adenopathy  Skin:     General: Skin is warm and dry  Capillary Refill: Capillary refill takes less than 2 seconds  Coloration: Skin is not jaundiced or pale  Findings: No bruising, erythema, lesion or rash  Neurological:      Mental Status: He is alert     Psychiatric:         Mood and Affect: Mood normal          Vital Signs  ED Triage Vitals [12/07/22 0835]   Temperature Pulse Respirations Blood Pressure SpO2   98 5 °F (36 9 °C) 86 16 (!) 166/101 98 %      Temp Source Heart Rate Source Patient Position - Orthostatic VS BP Location FiO2 (%)   Oral Monitor Lying Right arm --      Pain Score       4           Vitals:    12/07/22 0835 12/07/22 0841 12/07/22 0900   BP: (!) 166/101 131/76 135/67   Pulse: 86 80 78   Patient Position - Orthostatic VS: Lying Lying Sitting         Visual Acuity      ED Medications  Medications - No data to display    Diagnostic Studies  Results Reviewed     Procedure Component Value Units Date/Time    HS Troponin 0hr (reflex protocol) [[id number]]  (Normal) Collected: 12/07/22 0841    Lab Status: Final result Specimen: Blood from Arm, Right Updated: 12/07/22 0928     hs TnI 0hr 3 ng/L     Comprehensive metabolic panel [[id number]]  (Abnormal) Collected: 12/07/22 0841    Lab Status: Final result Specimen: Blood from Arm, Right Updated: 12/07/22 0914     Sodium 136 mmol/L      Potassium 3 9 mmol/L      Chloride 109 mmol/L      CO2 22 mmol/L      ANION GAP 5 mmol/L      BUN 18 mg/dL      Creatinine 0 81 mg/dL      Glucose 109 mg/dL      Calcium 8 5 mg/dL      AST 17 U/L      ALT 19 U/L      Alkaline Phosphatase 46 U/L      Total Protein 7 0 g/dL      Albumin 4 1 g/dL      Total Bilirubin 0 54 mg/dL      eGFR 105 ml/min/1 73sq m     Narrative:      [facility] guidelines for Chronic Kidney Disease (CKD):   •  Stage 1 with normal or high GFR (GFR > 90 mL/min/1 73 square meters)  •  Stage 2 Mild CKD (GFR = 60-89 mL/min/1 73 square meters)  •  Stage 3A Moderate CKD (GFR = 45-59 mL/min/1 73 square meters)  •  Stage 3B Moderate CKD (GFR = 30-44 mL/min/1 73 square meters)  •  Stage 4 Severe CKD (GFR = 15-29 mL/min/1 73 square meters)  •  Stage 5 End Stage CKD (GFR <15 mL/min/1 73 square meters)  Note: GFR calculation is accurate only with a steady state creatinine    CBC and differential [[id number]] Collected: 12/07/22 0841    Lab Status: Final result Specimen: Blood from Arm, Right Updated: 12/07/22 0852     WBC 7 05 Thousand/uL      RBC 5 43 Million/uL      Hemoglobin 15 1 g/dL      Hematocrit 46 8 %      MCV 86 fL      MCH 27 8 pg      MCHC 32 3 g/dL      RDW 13 7 %      MPV 11 8 fL      Platelets 235 Thousands/uL      nRBC 0 /100 WBCs      Neutrophils Relative 51 %      Immat GRANS % 1 %      Lymphocytes Relative 37 %      Monocytes Relative 7 %      Eosinophils Relative 3 %      Basophils Relative 1 %      Neutrophils Absolute 3 66 Thousands/µL      Immature Grans Absolute 0 04 Thousand/uL      Lymphocytes Absolute 2 60 Thousands/µL      Monocytes Absolute 0 49 Thousand/µL      Eosinophils Absolute 0 19 Thousand/µL      Basophils Absolute 0 07 Thousands/µL                  XR chest 1 view portable   Final Result by Rodrigo No MD (12/07 2835)      No acute cardiopulmonary disease  Workstation performed: [id number]                    Procedures  ECG 12 Lead Documentation Only    Date/Time: 12/7/2022 5:15 PM  Performed by: Sasha Kaur PA-C  Authorized by: Sasha Kaur PA-C     Indications / Diagnosis:  Chest pain  ECG reviewed by me, the ED Provider: yes    Patient location:  ED  Previous ECG:     Previous ECG:  Unavailable  Interpretation:     Interpretation: normal    Rate:     ECG rate:  76    ECG rate assessment: normal    Rhythm:     Rhythm: sinus rhythm    Ectopy:     Ectopy: none    QRS:     QRS axis:  Normal    QRS intervals:  Normal  Conduction:     Conduction: normal    ST segments:     ST segments:  Normal  T waves:     T waves: normal               ED Course                                             MDM  Number of Diagnoses or Management Options  Atypical chest pain  Rib pain on left side  Diagnosis management comments: 59-year-old male presenting with left-sided rib pain  Due to patient's family history of cardiac disease we will work patient up for this chest pain  Phone was negative as well as EKG and chest x-ray  Labs within normal limits  Patient states that the pain is mainly after he eats and feels like a burning in his chest   We will treat patient with pantoprazole and have him see primary care  Patient looks well upon time of discharge and denies any change in symptoms  Disposition  Final diagnoses:   Rib pain on left side   Atypical chest pain     Time reflects when diagnosis was documented in both MDM as applicable and the Disposition within this note     Time User Action Codes Description Comment    12/7/2022  9:37 AM Saurabh Diaz [R07 81] Rib pain on left side     12/7/2022  5:15 PM Saurabh Diaz [R07 89] Atypical chest pain       ED Disposition     ED Disposition   Discharge    Condition   Stable    Date/Time   Wed Dec 7, 2022  9:39 AM    Comment   Romana Linker discharge to home/self care  Follow-up Information     Follow up With Specialties Details Why Contact Info Additional [address] Emergency Department Emergency Medicine Go to  If symptoms worsen [address] [facility] [address] [facility] Emergency Department, [address], [place], [place], [address]          Discharge Medication List as of 12/7/2022  9:39 AM      START taking these medications    Details   pantoprazole (PROTONIX) 40 mg tablet Take 1 tablet (40 mg total) by mouth daily, Starting Wed 12/7/2022, Until Fri 1/6/2023, Normal         CONTINUE these medications which have NOT CHANGED    Details   cephalexin (KEFLEX) 500 mg capsule TAKE 1 CAPSULE BY MOUTH 4 TIMES A DAY FOR 10 DAYS, Historical Med      fluticasone (FLONASE) 50 mcg/act nasal spray 1 spray into each nostril daily, Starting Fri 7/29/2022, Normal      loratadine (CLARITIN) 10 mg tablet Take 1 tablet (10 mg total) by mouth daily, Starting Fri 7/29/2022, Normal      Poison Ivy Treatments (ZANFEL) MISC Apply 1 application topically 3 (three) times a day as needed (for pruritis), Starting Wed 7/15/2020, Print      predniSONE 10 mg tablet 5 tab once a day for 2 days, 4 tab once a day for 3 days, 3 tab once a day for 3 days, 2 tab once a day for 3 days, 1 tab once a day for 3 days, Print             No discharge procedures on file      PDMP Review     None          ED Provider  Electronically Signed by           Simon Sanders PA-C  12/07/22 7821

## 2023-01-12 ENCOUNTER — CLINICAL SUPPORT (OUTPATIENT)
Dept: FAMILY MEDICINE CLINIC | Facility: CLINIC | Age: 48
End: 2023-01-12

## 2023-01-12 DIAGNOSIS — Z23 NEED FOR COVID-19 VACCINE: Primary | ICD-10-CM

## 2023-01-20 DIAGNOSIS — J45.909 ASTHMA DUE TO ENVIRONMENTAL ALLERGIES: ICD-10-CM

## 2023-01-20 RX ORDER — LORATADINE 10 MG/1
TABLET ORAL
Qty: 90 TABLET | Refills: 1 | Status: SHIPPED | OUTPATIENT
Start: 2023-01-20

## 2023-01-27 ENCOUNTER — OFFICE VISIT (OUTPATIENT)
Dept: FAMILY MEDICINE CLINIC | Facility: CLINIC | Age: 48
End: 2023-01-27

## 2023-01-27 VITALS
DIASTOLIC BLOOD PRESSURE: 87 MMHG | TEMPERATURE: 98.3 F | HEIGHT: 67 IN | SYSTOLIC BLOOD PRESSURE: 136 MMHG | HEART RATE: 64 BPM | BODY MASS INDEX: 34.69 KG/M2 | WEIGHT: 221 LBS | RESPIRATION RATE: 20 BRPM | OXYGEN SATURATION: 98 %

## 2023-01-27 DIAGNOSIS — M54.50 ACUTE MIDLINE LOW BACK PAIN WITHOUT SCIATICA: ICD-10-CM

## 2023-01-27 DIAGNOSIS — J45.909 ASTHMA DUE TO ENVIRONMENTAL ALLERGIES: ICD-10-CM

## 2023-01-27 DIAGNOSIS — K21.9 GASTROESOPHAGEAL REFLUX DISEASE, UNSPECIFIED WHETHER ESOPHAGITIS PRESENT: Primary | ICD-10-CM

## 2023-01-27 DIAGNOSIS — R07.81 RIB PAIN ON LEFT SIDE: ICD-10-CM

## 2023-01-27 RX ORDER — PANTOPRAZOLE SODIUM 40 MG/1
40 TABLET, DELAYED RELEASE ORAL DAILY
Qty: 30 TABLET | Refills: 0 | Status: SHIPPED | OUTPATIENT
Start: 2023-01-27 | End: 2023-02-26

## 2023-01-27 RX ORDER — FLUTICASONE PROPIONATE 50 MCG
1 SPRAY, SUSPENSION (ML) NASAL DAILY
Qty: 11.1 ML | Refills: 3 | Status: SHIPPED | OUTPATIENT
Start: 2023-01-27

## 2023-01-27 NOTE — ASSESSMENT & PLAN NOTE
Assessment: post prandial heartburn with acidic taste in mouth since 12/7/22, consistent with GERD  No red flag symptoms    Started Protonix last month, using every 3-4 days     Plan:   Continue Protonix for next month with plan to transition to Pepcid later on  Continue lifestyle modifications

## 2023-01-27 NOTE — ASSESSMENT & PLAN NOTE
Assessment: midline lumbar-region lower back pain  No radiation  Very mild tenderness to palpation  No motor or sensory symptoms   Patient has been seeing a therapist and has been using diclofenac gel with relief    Plan:   -Continue with therapy  -Continue to use Diclofenac as needed

## 2023-01-27 NOTE — PROGRESS NOTES
Name: Berna Roldan      : 1975      MRN: [id number]  Encounter Provider: Marixa Valdez MD  Encounter Date: 2023   Encounter department: [address] [place] [address]  Gastroesophageal reflux disease, unspecified whether esophagitis present  Assessment & Plan:  Assessment: post prandial heartburn with acidic taste in mouth since 22, consistent with GERD  No red flag symptoms  Started Protonix last month, using every 3-4 days     Plan:   Continue Protonix for next month with plan to transition to Pepcid later on  Continue lifestyle modifications         2  Rib pain on left side  -     pantoprazole (PROTONIX) 40 mg tablet; Take 1 tablet (40 mg total) by mouth daily    3  Asthma due to environmental allergies  Assessment & Plan:  Refill for Flonase provided    Orders:  -     fluticasone (FLONASE) 50 mcg/act nasal spray; 1 spray into each nostril daily    4  Acute midline low back pain without sciatica  Assessment & Plan:  Assessment: midline lumbar-region lower back pain  No radiation  Very mild tenderness to palpation  No motor or sensory symptoms  Patient has been seeing a therapist and has been using diclofenac gel with relief    Plan:   -Continue with therapy  -Continue to use Diclofenac as needed    Orders:  -     Diclofenac Sodium (VOLTAREN) 1 %; Apply 2 g topically 4 (four) times a day         Subjective      CC: Pt requesting "medication" for heartburn and refill for flonase    Started Protonix on 22 due to "stomach gas"  Has only been taking one pill every three days  He has been taking the protonix after he eats  Patient describes post prandial episodes of burning in chest, acidic taste in mouth  No hematochezia, hematemesis  No arm numbness or SOB with these episodes  Has been attempting to reduce intake of spicy foods, chocolate, corn flakes, etc  Has hired a  to try to lose weight   Elevating head of bed at night    Patient also describes lower lumbar back pain for past several months  He has been seeing a therapist for it  There is no motor involvement or radiation of the pain  He has been getting relief with diclofenac gel  Review of Systems   Constitutional: Negative for activity change, appetite change, fever and unexpected weight change  Respiratory: Negative for choking, chest tightness, shortness of breath and wheezing  Cardiovascular: Negative for chest pain and palpitations  Gastrointestinal: Positive for abdominal pain (hx of GERD, develops discomfort with eating peppers)  Negative for anal bleeding, blood in stool, constipation, nausea, rectal pain and vomiting  When eating spicy foods, goes away after a couple hours   Genitourinary: Negative for decreased urine volume, difficulty urinating, dysuria and enuresis  Musculoskeletal: Positive for back pain  Negative for gait problem  Allergic/Immunologic: Positive for environmental allergies  Neurological: Negative for syncope         Current Outpatient Medications on File Prior to Visit   Medication Sig   • loratadine (CLARITIN) 10 mg tablet TAKE 1 TABLET BY MOUTH EVERY DAY   • [DISCONTINUED] fluticasone (FLONASE) 50 mcg/act nasal spray 1 spray into each nostril daily (Patient taking differently: 1 spray into each nostril as needed)   • Poison Ivy Treatments (ZANFEL) MISC Apply 1 application topically 3 (three) times a day as needed (for pruritis) (Patient not taking: Reported on 7/29/2022)   • [DISCONTINUED] cephalexin (KEFLEX) 500 mg capsule TAKE 1 CAPSULE BY MOUTH 4 TIMES A DAY FOR 10 DAYS (Patient not taking: Reported on 8/26/2022)   • [DISCONTINUED] pantoprazole (PROTONIX) 40 mg tablet Take 1 tablet (40 mg total) by mouth daily   • [DISCONTINUED] predniSONE 10 mg tablet 5 tab once a day for 2 days, 4 tab once a day for 3 days, 3 tab once a day for 3 days, 2 tab once a day for 3 days, 1 tab once a day for 3 days (Patient not taking: Reported on 7/29/2022)       Objective     /87 (BP Location: Right arm, Patient Position: Sitting, Cuff Size: Adult)   Pulse 64   Temp 98 3 °F (36 8 °C) (Temporal)   Resp 20   Ht 5' 7" (1 702 m)   Wt 100 kg (221 lb)   SpO2 98%   BMI 34 61 kg/m²     Physical Exam  Vitals reviewed  Constitutional:       Appearance: He is obese  HENT:      Head: Normocephalic and atraumatic  Mouth/Throat:      Mouth: Mucous membranes are dry  Pharynx: Oropharynx is clear  Eyes:      General: No scleral icterus  Extraocular Movements: Extraocular movements intact  Cardiovascular:      Rate and Rhythm: Normal rate and regular rhythm  Pulses: Normal pulses  Heart sounds: No murmur heard  Pulmonary:      Effort: Pulmonary effort is normal  No respiratory distress  Breath sounds: Normal breath sounds  Abdominal:      General: Abdomen is flat  Bowel sounds are normal  There is no distension  Palpations: Abdomen is soft  There is no mass  Tenderness: There is no abdominal tenderness  There is no guarding or rebound  Hernia: No hernia is present  Musculoskeletal:         General: Normal range of motion  Arms:    Skin:     General: Skin is warm and dry  Capillary Refill: Capillary refill takes less than 2 seconds  Coloration: Skin is not jaundiced  Neurological:      Mental Status: He is alert and oriented to person, place, and time  Psychiatric:         Mood and Affect: Mood normal          Behavior: Behavior normal          Thought Content:  Thought content normal          Judgment: Judgment normal        Kasey Lanier MD

## 2023-02-24 DIAGNOSIS — K21.9 GASTROESOPHAGEAL REFLUX DISEASE WITHOUT ESOPHAGITIS: Primary | ICD-10-CM

## 2023-02-24 DIAGNOSIS — R07.81 RIB PAIN ON LEFT SIDE: ICD-10-CM

## 2023-02-24 RX ORDER — PANTOPRAZOLE SODIUM 40 MG/1
TABLET, DELAYED RELEASE ORAL
Qty: 30 TABLET | Refills: 0 | OUTPATIENT
Start: 2023-02-24

## 2023-02-24 RX ORDER — FAMOTIDINE 20 MG/1
20 TABLET, FILM COATED ORAL 2 TIMES DAILY
Qty: 60 TABLET | Refills: 0 | Status: SHIPPED | OUTPATIENT
Start: 2023-02-24

## 2023-02-24 NOTE — TELEPHONE ENCOUNTER
Good Morning Dr Be Bustillos  Please review and refill if appropriate  Per chart review:  Gastroesophageal reflux disease, unspecified whether esophagitis present  Assessment & Plan:  Assessment: post prandial heartburn with acidic taste in mouth since 12/7/22, consistent with GERD  No red flag symptoms  Started Protonix last month, using every 3-4 days      Plan:   Continue Protonix for next month with plan to transition to Pepcid later on  Continue lifestyle modifications    Please review   Thanks

## 2023-02-24 NOTE — TELEPHONE ENCOUNTER
Will discontinue Protonix and switch to Pepcid 20mg BID  Could you please make the patient aware? Thank you

## 2023-02-24 NOTE — TELEPHONE ENCOUNTER
Relayed Dr Katarzyna Gould message and told patietn medication was sent to pharmacy   No further questions

## 2023-03-27 DIAGNOSIS — K21.9 GASTROESOPHAGEAL REFLUX DISEASE WITHOUT ESOPHAGITIS: ICD-10-CM

## 2023-03-27 RX ORDER — FAMOTIDINE 20 MG/1
TABLET, FILM COATED ORAL
Qty: 60 TABLET | Refills: 0 | Status: SHIPPED | OUTPATIENT
Start: 2023-03-27

## 2023-03-27 NOTE — TELEPHONE ENCOUNTER
Good Morning Dr Minh Hudson  Please review, per chart review:  1  Gastroesophageal reflux disease, unspecified whether esophagitis present  Assessment & Plan:  Assessment: post prandial heartburn with acidic taste in mouth since 12/7/22, consistent with GERD  No red flag symptoms  Started Protonix last month, using every 3-4 days      Plan:   Continue Protonix for next month with plan to transition to Pepcid later on  Continue lifestyle modifications    Refill medication if appropriate   Thanks

## 2023-09-19 ENCOUNTER — HOSPITAL ENCOUNTER (EMERGENCY)
Facility: HOSPITAL | Age: 48
Discharge: HOME | End: 2023-09-19
Attending: EMERGENCY MEDICINE | Admitting: EMERGENCY MEDICINE
Payer: COMMERCIAL

## 2023-09-19 VITALS
TEMPERATURE: 97.8 F | RESPIRATION RATE: 18 BRPM | SYSTOLIC BLOOD PRESSURE: 136 MMHG | HEART RATE: 62 BPM | DIASTOLIC BLOOD PRESSURE: 92 MMHG | OXYGEN SATURATION: 98 %

## 2023-09-19 DIAGNOSIS — S05.02XA ABRASION OF LEFT CORNEA, INITIAL ENCOUNTER: Primary | ICD-10-CM

## 2023-09-19 PROCEDURE — 99284 EMERGENCY DEPT VISIT MOD MDM: CPT | Performed by: PHYSICIAN ASSISTANT

## 2023-09-19 PROCEDURE — 99282 EMERGENCY DEPT VISIT SF MDM: CPT

## 2023-09-19 RX ORDER — ERYTHROMYCIN 5 MG/G
0.5 OINTMENT OPHTHALMIC 3 TIMES DAILY
Qty: 3.5 G | Refills: 0 | Status: SHIPPED | OUTPATIENT
Start: 2023-09-19

## 2023-09-19 RX ORDER — TETRACAINE HYDROCHLORIDE 5 MG/ML
1 SOLUTION OPHTHALMIC ONCE
Status: COMPLETED | OUTPATIENT
Start: 2023-09-19 | End: 2023-09-19

## 2023-09-19 RX ADMIN — TETRACAINE HYDROCHLORIDE 1 DROP: 5 SOLUTION OPHTHALMIC at 14:08

## 2023-09-19 RX ADMIN — FLUORESCEIN SODIUM 1 STRIP: 1 STRIP OPHTHALMIC at 14:08

## 2023-09-19 NOTE — ED PROVIDER NOTES
History  Chief Complaint   Patient presents with   • Eye Problem     Patient was hit in eye with stone from weed rebecca. Since has been irritated and vision has been blury. 69-year-old male presents the emergency department with complaints of foreign body sensation in his left eye. States that he was doing some weed whacking earlier and did not have eyewear on. States that he felt a rock hit him in the eye and has had some pain since that time. Worse with blinking. Denies gross abnormality in his vision but states that it is slightly blurry due to tearing. Last tetanus shot       History provided by:  Patient   used: No    Eye Problem  Location:  Left eye  Quality:  Stinging  Onset quality:  Sudden  Timing:  Constant  Progression:  Unchanged  Chronicity:  New  Context: direct trauma    Relieved by:  Nothing  Ineffective treatments:  Closing eye and flushing  Associated symptoms: blurred vision, photophobia and tearing    Associated symptoms: no crusting, no decreased vision, no discharge, no double vision, no facial rash, no headaches, no inflammation, no itching, no nausea, no numbness, no redness, no scotomas, no swelling, no tingling, no vomiting and no weakness        Prior to Admission Medications   Prescriptions Last Dose Informant Patient Reported? Taking? Diclofenac Sodium (VOLTAREN) 1 %   No No   Sig: Apply 2 g topically 4 (four) times a day   Poison Ivy Treatments (ZANFEL) MISC  Self No No   Sig: Apply 1 application topically 3 (three) times a day as needed (for pruritis)   Patient not taking: Reported on 2022   famotidine (PEPCID) 20 mg tablet   No No   Sig: TAKE 1 TABLET BY MOUTH TWICE A DAY   fluticasone (FLONASE) 50 mcg/act nasal spray   No No   Si spray into each nostril daily   loratadine (CLARITIN) 10 mg tablet  Self No No   Sig: TAKE 1 TABLET BY MOUTH EVERY DAY      Facility-Administered Medications: None       History reviewed.  No pertinent past medical history. History reviewed. No pertinent surgical history. Family History   Problem Relation Age of Onset   • Anxiety disorder Mother    • Hypertension Father    • Diabetes Father    • Hypertension Sister    • Diabetes Sister      I have reviewed and agree with the history as documented. E-Cigarette/Vaping   • E-Cigarette Use Current Every Day User      E-Cigarette/Vaping Substances   • Nicotine No    • THC Yes    • CBD Yes    • Flavoring Yes    • Other No      Social History     Tobacco Use   • Smoking status: Former     Packs/day: 0.20     Types: Cigarettes     Quit date: 2021     Years since quittin.2   • Smokeless tobacco: Never   Vaping Use   • Vaping Use: Every day   • Substances: THC, CBD, Flavoring   Substance Use Topics   • Alcohol use: Not Currently   • Drug use: Yes     Types: Marijuana       Review of Systems   Constitutional: Negative for chills and fever. Eyes: Positive for blurred vision and photophobia. Negative for double vision, discharge, redness and itching. Respiratory: Negative for cough. Cardiovascular: Negative for chest pain. Gastrointestinal: Negative for nausea and vomiting. Musculoskeletal: Negative for arthralgias and back pain. Skin: Negative for rash and wound. Neurological: Negative for tingling, weakness, numbness and headaches. All other systems reviewed and are negative. Physical Exam  Physical Exam  Vitals and nursing note reviewed. Constitutional:       Appearance: He is well-developed. HENT:      Head: Normocephalic and atraumatic. Eyes:      General: Lids are normal. Vision grossly intact. Conjunctiva/sclera:      Right eye: Right conjunctiva is not injected. No chemosis or exudate. Left eye: Left conjunctiva is not injected. No chemosis or exudate. Pupils: Pupils are equal, round, and reactive to light. Left eye: Corneal abrasion (2, small, linear abrasions present ) present.      Cardiovascular:      Rate and Rhythm: Normal rate. Pulmonary:      Effort: Pulmonary effort is normal. No respiratory distress. Skin:     General: Skin is warm and dry. Neurological:      Mental Status: He is alert and oriented to person, place, and time. Psychiatric:         Mood and Affect: Mood normal.         Behavior: Behavior normal.         Vital Signs  ED Triage Vitals [09/19/23 1346]   Temperature Pulse Respirations Blood Pressure SpO2   97.8 °F (36.6 °C) 62 18 136/92 98 %      Temp Source Heart Rate Source Patient Position - Orthostatic VS BP Location FiO2 (%)   Oral Monitor -- Right arm --      Pain Score       No Pain           Vitals:    09/19/23 1346   BP: 136/92   Pulse: 62         Visual Acuity      ED Medications  Medications   tetracaine 0.5 % ophthalmic solution 1 drop (1 drop Left Eye Given by Other 9/19/23 1408)   fluorescein sodium sterile ophthalmic strip 1 strip (1 strip Left Eye Given by Other 9/19/23 1408)       Diagnostic Studies  Results Reviewed     None                 No orders to display              Procedures  Procedures         ED Course                                             Medical Decision Making  Differential diagnosis includes but not limited to: Corneal abrasion, corneal foreign body, hordeolum    Abrasion of left cornea, initial encounter: acute illness or injury  Risk  Prescription drug management. Disposition  Final diagnoses:   Abrasion of left cornea, initial encounter     Time reflects when diagnosis was documented in both MDM as applicable and the Disposition within this note     Time User Action Codes Description Comment    9/19/2023  2:25 PM Kris [address] [place] [address] [place] [S05. 02XA] Abrasion of left cornea, initial encounter       ED Disposition     ED Disposition   Discharge    Condition   Stable    Date/Time   Tue Sep 19, 2023  2:25 PM    Comment   Chucho King discharge to home/self care.                Follow-up Information     Follow up With Specialties Details Why Contact Kerline Doherty MD Ophthalmology Schedule an appointment as soon as possible for a visit   [address] [place] [address] [address] [address]  [phone]            Patient's Medications   Discharge Prescriptions    ERYTHROMYCIN (ILOTYCIN) OPHTHALMIC OINTMENT    Administer 0.5 inches to both eyes 3 (three) times a day       Start Date: 9/19/2023 End Date: --       Order Dose: 0.5 inches       Quantity: 3.5 g    Refills: 0       No discharge procedures on file.     PDMP Review     None          ED Provider  Electronically Signed by           Lilian Contreras PA-C  09/19/23 8215

## 2023-10-08 ENCOUNTER — APPOINTMENT (EMERGENCY)
Dept: RADIOLOGY | Facility: HOSPITAL | Age: 48
End: 2023-10-08
Payer: COMMERCIAL

## 2023-10-08 ENCOUNTER — HOSPITAL ENCOUNTER (EMERGENCY)
Facility: HOSPITAL | Age: 48
Discharge: HOME | End: 2023-10-08
Attending: EMERGENCY MEDICINE
Payer: COMMERCIAL

## 2023-10-08 VITALS
RESPIRATION RATE: 19 BRPM | TEMPERATURE: 98.2 F | HEART RATE: 75 BPM | DIASTOLIC BLOOD PRESSURE: 81 MMHG | SYSTOLIC BLOOD PRESSURE: 133 MMHG | OXYGEN SATURATION: 98 %

## 2023-10-08 DIAGNOSIS — S69.91XA HAND INJURY, RIGHT, INITIAL ENCOUNTER: Primary | ICD-10-CM

## 2023-10-08 PROCEDURE — 73130 X-RAY EXAM OF HAND: CPT

## 2023-10-08 PROCEDURE — 99283 EMERGENCY DEPT VISIT LOW MDM: CPT

## 2023-10-08 PROCEDURE — 96372 THER/PROPH/DIAG INJ SC/IM: CPT

## 2023-10-08 PROCEDURE — 99284 EMERGENCY DEPT VISIT MOD MDM: CPT | Performed by: EMERGENCY MEDICINE

## 2023-10-08 RX ORDER — IBUPROFEN 600 MG/1
600 TABLET ORAL EVERY 6 HOURS PRN
Qty: 20 TABLET | Refills: 0 | Status: SHIPPED | OUTPATIENT
Start: 2023-10-08 | End: 2023-10-13

## 2023-10-08 RX ORDER — KETOROLAC TROMETHAMINE 30 MG/ML
15 INJECTION, SOLUTION INTRAMUSCULAR; INTRAVENOUS ONCE
Status: COMPLETED | OUTPATIENT
Start: 2023-10-08 | End: 2023-10-08

## 2023-10-08 RX ADMIN — KETOROLAC TROMETHAMINE 15 MG: 30 INJECTION, SOLUTION INTRAMUSCULAR; INTRAVENOUS at 17:03

## 2023-10-08 NOTE — DISCHARGE INSTRUCTIONS
Your x-ray does not show fracture. We will send it to the radiologist for review. If any abnormalities noted you will get a call. Please use ibuprofen for pain as needed. Follow-up with hand surgery.

## 2023-10-09 ENCOUNTER — OFFICE VISIT (OUTPATIENT)
Dept: OBGYN CLINIC | Facility: CLINIC | Age: 48
End: 2023-10-09
Payer: COMMERCIAL

## 2023-10-09 VITALS
SYSTOLIC BLOOD PRESSURE: 150 MMHG | OXYGEN SATURATION: 99 % | BODY MASS INDEX: 32.71 KG/M2 | DIASTOLIC BLOOD PRESSURE: 88 MMHG | HEIGHT: 67 IN | WEIGHT: 208.4 LBS | HEART RATE: 73 BPM

## 2023-10-09 DIAGNOSIS — S60.221A CONTUSION OF RIGHT HAND, INITIAL ENCOUNTER: Primary | ICD-10-CM

## 2023-10-09 DIAGNOSIS — S69.91XA HAND INJURY, RIGHT, INITIAL ENCOUNTER: ICD-10-CM

## 2023-10-09 PROCEDURE — 99203 OFFICE O/P NEW LOW 30 MIN: CPT | Performed by: PHYSICIAN ASSISTANT

## 2023-10-09 PROCEDURE — 29075 APPL CST ELBW FNGR SHORT ARM: CPT | Performed by: PHYSICIAN ASSISTANT

## 2023-10-09 NOTE — PROGRESS NOTES
ASSESSMENT/PLAN:    Assessment:   Contusion, 5th Metacarpal, right    Plan:   Hand-based ulnar gutter splint, as needed for activity    Follow Up:  2  week(s) with Dr. Jim Walker    To Do Next Visit:       General Discussions:           _____________________________________________________  CHIEF COMPLAINT:  Chief Complaint   Patient presents with   • Right Hand - Pain     3 days- tripped and slapped railing         SUBJECTIVE:  Fabiana Siddiqui is a 51 y.o. male who presents with Pain  Mild  Intermittant  Dull of the right volar, 5th MC head area. This started  3 day(s) ago: when he tripped and struck the open palm of his right hand on a railing. Xrays in the ED were normal.  Radiation: None  Previous Treatments: None   Associated symptoms: None  Handedness: right  Work status:     PAST MEDICAL HISTORY:  History reviewed. No pertinent past medical history. PAST SURGICAL HISTORY:  History reviewed. No pertinent surgical history.     FAMILY HISTORY:  Family History   Problem Relation Age of Onset   • Anxiety disorder Mother    • Hypertension Father    • Diabetes Father    • Hypertension Sister    • Diabetes Sister        SOCIAL HISTORY:  Social History     Tobacco Use   • Smoking status: Former     Packs/day: 0.20     Types: Cigarettes     Quit date: 2021     Years since quittin.3   • Smokeless tobacco: Never   Vaping Use   • Vaping Use: Every day   • Substances: THC, CBD, Flavoring   Substance Use Topics   • Alcohol use: Not Currently   • Drug use: Yes     Types: Marijuana       MEDICATIONS:    Current Outpatient Medications:   •  Diclofenac Sodium (VOLTAREN) 1 %, Apply 2 g topically 4 (four) times a day, Disp: 100 g, Rfl: 3  •  erythromycin (ILOTYCIN) ophthalmic ointment, Administer 0.5 inches to both eyes 3 (three) times a day, Disp: 3.5 g, Rfl: 0  •  famotidine (PEPCID) 20 mg tablet, TAKE 1 TABLET BY MOUTH TWICE A DAY, Disp: 60 tablet, Rfl: 0  •  fluticasone (FLONASE) 50 mcg/act nasal spray, 1 spray into each nostril daily, Disp: 11.1 mL, Rfl: 3  •  ibuprofen (MOTRIN) 600 mg tablet, Take 1 tablet (600 mg total) by mouth every 6 (six) hours as needed for mild pain for up to 5 days, Disp: 20 tablet, Rfl: 0  •  loratadine (CLARITIN) 10 mg tablet, TAKE 1 TABLET BY MOUTH EVERY DAY, Disp: 90 tablet, Rfl: 1  •  Poison Ivy Treatments (ZANFEL) MISC, Apply 1 application topically 3 (three) times a day as needed (for pruritis) (Patient not taking: Reported on 7/29/2022), Disp: 1 Tube, Rfl: 0    ALLERGIES:  No Known Allergies    REVIEW OF SYSTEMS:  Pertinent items are noted in HPI. A comprehensive review of systems was negative. LABS:  HgA1c: No results found for: "HGBA1C"  BMP:   Lab Results   Component Value Date    CALCIUM 8.5 12/07/2022    K 3.9 12/07/2022    CO2 22 12/07/2022     (H) 12/07/2022    BUN 18 12/07/2022    CREATININE 0.81 12/07/2022         _____________________________________________________  PHYSICAL EXAMINATION:  Vital signs: /88   Pulse 73   Ht 5' 7" (1.702 m)   Wt 94.5 kg (208 lb 6.4 oz)   SpO2 99%   BMI 32.64 kg/m²   General: well developed and well nourished, alert, oriented times 3 and appears comfortable  Psychiatric: Normal  HEENT: Trachea Midline, No torticollis  Cardiovascular: Regular rate and rhythm  Pulmonary: No audible wheezing   Abdomen: No guarding  Extremities: No lymphedema  Skin: No masses, erythema, lacerations, fluctation, ulcerations  Neurovascular: Sensation Intact to the Median, Ulnar, Radial Nerve, Motor Intact to the Median, Ulnar, Radial Nerve and Pulses Intact    MUSCULOSKELETAL EXAMINATION:  RIGHT SIDE:  hand: Mild swelling at the 5th MCP area. No ecchymosis or erythema.  + tenderness of the volar 5th MC head. Full, pain-free ROM DIP and PIP.  0-60 MCP. No lateral MCP laxity whether in flexion or extension. Able to flex each joint independently indicating tendons are intact.   NVI distally        _____________________________________________________  STUDIES REVIEWED:  No acute displaced fracture on xray      PROCEDURES PERFORMED:  Cast application    Date/Time: 10/9/2023 7:00 PM    Performed by: Tootie Joseph PA-C  Authorized by: Tootie Joseph PA-C  Universal Protocol:  Consent: Verbal consent obtained. Risks and benefits: risks, benefits and alternatives were discussed  Consent given by: patient  Timeout called at: 10/9/2023 7:06 PM.  Patient understanding: patient states understanding of the procedure being performed  Radiology Images displayed and confirmed. If images not available, report reviewed: imaging studies available  Patient identity confirmed: verbally with patient      Pre-procedure details:     Sensation:  Normal  Procedure details:     Laterality:  Right    Location:  Hand    Splint type: hand-based ulnar gutter. Supplies:  Ortho-Glass  Post-procedure details:     Pain:  Unchanged    Sensation:  Normal    Patient tolerance of procedure:   Tolerated well, no immediate complications

## 2023-10-09 NOTE — ED PROVIDER NOTES
History  Chief Complaint   Patient presents with   • Hand Injury     2 days ago pt jammed hand into a railing. Injury to R hand 5th finger palm, mild swelling noted. Pt states he has worsening pain. History provided by:  Patient   used: No    Hand Injury  Associated symptoms: no fever      Patient is a 50-year-old presents to the ER with right hand injury. Hit palm on railing ago. Continues to have pain. Neuro vas intact distally no other injuries. Did not punch anything. Has not had injury to the hand before. Prior to Admission Medications   Prescriptions Last Dose Informant Patient Reported? Taking? Diclofenac Sodium (VOLTAREN) 1 %   No No   Sig: Apply 2 g topically 4 (four) times a day   Poison Ivy Treatments (ZANFEL) MISC  Self No No   Sig: Apply 1 application topically 3 (three) times a day as needed (for pruritis)   Patient not taking: Reported on 2022   erythromycin (ILOTYCIN) ophthalmic ointment   No No   Sig: Administer 0.5 inches to both eyes 3 (three) times a day   famotidine (PEPCID) 20 mg tablet   No No   Sig: TAKE 1 TABLET BY MOUTH TWICE A DAY   fluticasone (FLONASE) 50 mcg/act nasal spray   No No   Si spray into each nostril daily   loratadine (CLARITIN) 10 mg tablet  Self No No   Sig: TAKE 1 TABLET BY MOUTH EVERY DAY      Facility-Administered Medications: None       History reviewed. No pertinent past medical history. History reviewed. No pertinent surgical history. Family History   Problem Relation Age of Onset   • Anxiety disorder Mother    • Hypertension Father    • Diabetes Father    • Hypertension Sister    • Diabetes Sister      I have reviewed and agree with the history as documented.     E-Cigarette/Vaping   • E-Cigarette Use Current Every Day User      E-Cigarette/Vaping Substances   • Nicotine No    • THC Yes    • CBD Yes    • Flavoring Yes    • Other No      Social History     Tobacco Use   • Smoking status: Former     Packs/day: 0.20 Types: Cigarettes     Quit date: 2021     Years since quittin.3   • Smokeless tobacco: Never   Vaping Use   • Vaping Use: Every day   • Substances: THC, CBD, Flavoring   Substance Use Topics   • Alcohol use: Not Currently   • Drug use: Yes     Types: Marijuana       Review of Systems   Constitutional: Negative for chills, diaphoresis and fever. Respiratory: Negative for cough, shortness of breath, wheezing and stridor. Cardiovascular: Negative for chest pain, palpitations and leg swelling. Gastrointestinal: Negative for abdominal pain, blood in stool, diarrhea, nausea and vomiting. Genitourinary: Negative for dysuria, frequency and urgency. Musculoskeletal: Negative for neck stiffness. Skin: Negative for pallor and rash. Neurological: Negative for dizziness, syncope, weakness, light-headedness and headaches. All other systems reviewed and are negative. Physical Exam  Physical Exam  Vitals reviewed. Constitutional:       Appearance: Normal appearance. He is well-developed. HENT:      Head: Normocephalic and atraumatic. Eyes:      Extraocular Movements: Extraocular movements intact. Pupils: Pupils are equal, round, and reactive to light. Cardiovascular:      Rate and Rhythm: Normal rate and regular rhythm. Pulmonary:      Effort: Pulmonary effort is normal. No respiratory distress. Musculoskeletal:         General: Tenderness present. No swelling, deformity or signs of injury. Normal range of motion. Cervical back: Normal range of motion and neck supple. Comments: Tenderness over the base of the fifth right digit   Skin:     General: Skin is warm and dry. Capillary Refill: Capillary refill takes less than 2 seconds. Neurological:      General: No focal deficit present. Mental Status: He is alert and oriented to person, place, and time.          Vital Signs  ED Triage Vitals   Temperature Pulse Respirations Blood Pressure SpO2   10/08/23 1636 10/08/23 1636 10/08/23 1636 10/08/23 1636 10/08/23 1636   98.2 °F (36.8 °C) 75 19 133/81 98 %      Temp Source Heart Rate Source Patient Position - Orthostatic VS BP Location FiO2 (%)   10/08/23 1636 10/08/23 1636 10/08/23 1636 10/08/23 1636 --   Oral Monitor Sitting Right arm       Pain Score       10/08/23 1702       9           Vitals:    10/08/23 1636   BP: 133/81   Pulse: 75   Patient Position - Orthostatic VS: Sitting         Visual Acuity      ED Medications  Medications   ketorolac (TORADOL) injection 15 mg (15 mg Intramuscular Given 10/8/23 1703)       Diagnostic Studies  Results Reviewed     None                 XR hand 3+ views RIGHT    (Results Pending)              Procedures  Procedures         ED Course                                             Medical Decision Making  78-year-old with soft tissue injury versus fracture, will get x-ray, NSAIDs for pain    No fracture on my read of x-ray. Amount and/or Complexity of Data Reviewed  Radiology: ordered. Risk  Prescription drug management. Disposition  Final diagnoses:   Hand injury, right, initial encounter     Time reflects when diagnosis was documented in both MDM as applicable and the Disposition within this note     Time User Action Codes Description Comment    10/8/2023  5:03 PM Tima Bhatia Add [Z19.00FL] Hand injury, right, initial encounter       ED Disposition     ED Disposition   Discharge    Condition   Stable    Date/Time   Sun Oct 8, 2023  5:03 PM    Comment   Prasad Aguilera discharge to home/self care.                Follow-up Information     Follow up With Specialties Details Why Contact Info Additional [address] [facility] Emergency Department Emergency Medicine  As needed, If symptoms worsen [address] [facility], [place], [place], [address] [place] [address] Specialists [place] Orthopedic Surgery Schedule an appointment as soon as possible for a visit  Hand injury [address] [address]  [facility] Specialists Aury Sotelo, [address] [facility] [address], [address] [place], [address] [place] [address]          Discharge Medication List as of 10/8/2023  5:05 PM      START taking these medications    Details   ibuprofen (MOTRIN) 600 mg tablet Take 1 tablet (600 mg total) by mouth every 6 (six) hours as needed for mild pain for up to 5 days, Starting Sun 10/8/2023, Until Fri 10/13/2023 at 2359, Normal         CONTINUE these medications which have NOT CHANGED    Details   Diclofenac Sodium (VOLTAREN) 1 % Apply 2 g topically 4 (four) times a day, Starting Fri 1/27/2023, Normal      erythromycin (ILOTYCIN) ophthalmic ointment Administer 0.5 inches to both eyes 3 (three) times a day, Starting Tue 9/19/2023, Normal      famotidine (PEPCID) 20 mg tablet TAKE 1 TABLET BY MOUTH TWICE A DAY, Normal      fluticasone (FLONASE) 50 mcg/act nasal spray 1 spray into each nostril daily, Starting Fri 1/27/2023, Normal      loratadine (CLARITIN) 10 mg tablet TAKE 1 TABLET BY MOUTH EVERY DAY, Normal      Poison Ivy Treatments (ZANFEL) MISC Apply 1 application topically 3 (three) times a day as needed (for pruritis), Starting Wed 7/15/2020, Print                 PDMP Review     None          ED Provider  Electronically Signed by           Nhi Will MD  10/08/23 2050

## 2023-10-11 ENCOUNTER — VBI (OUTPATIENT)
Dept: ADMINISTRATIVE | Facility: OTHER | Age: 48
End: 2023-10-11

## 2023-11-13 ENCOUNTER — HOSPITAL ENCOUNTER (OUTPATIENT)
Dept: RADIOLOGY | Facility: HOSPITAL | Age: 48
Discharge: HOME | End: 2023-11-13
Attending: STUDENT IN AN ORGANIZED HEALTH CARE EDUCATION/TRAINING PROGRAM
Payer: COMMERCIAL

## 2023-11-13 ENCOUNTER — OFFICE VISIT (OUTPATIENT)
Dept: OBGYN CLINIC | Facility: CLINIC | Age: 48
End: 2023-11-13
Payer: COMMERCIAL

## 2023-11-13 VITALS — HEIGHT: 67 IN | WEIGHT: 208 LBS | BODY MASS INDEX: 32.65 KG/M2

## 2023-11-13 DIAGNOSIS — S69.91XA HAND INJURY, RIGHT, INITIAL ENCOUNTER: ICD-10-CM

## 2023-11-13 DIAGNOSIS — S69.91XA HAND INJURY, RIGHT, INITIAL ENCOUNTER: Primary | ICD-10-CM

## 2023-11-13 PROCEDURE — 73130 X-RAY EXAM OF HAND: CPT

## 2023-11-13 PROCEDURE — 99214 OFFICE O/P EST MOD 30 MIN: CPT | Performed by: STUDENT IN AN ORGANIZED HEALTH CARE EDUCATION/TRAINING PROGRAM

## 2023-11-13 NOTE — PROGRESS NOTES
ORTHOPAEDIC HAND, WRIST, AND ELBOW OFFICE  VISIT       ASSESSMENT/PLAN:      Diagnoses and all orders for this visit:    Hand injury, right, initial encounter  -     XR hand 3+ vw right; Future  -     Diclofenac Sodium (VOLTAREN) 1 %; Apply 2 g topically 4 (four) times a day  -     Ambulatory Referral to PT/OT Hand Therapy; Future          51 y.o. male with right 5th metacarpal contusion sustained 10/06/23  Xrays reviewed  Pt informed he continues to have no signs of fx on exam  We discussed soft tissue injury and contusion and how this can take time to heal  Since pt already feeling better, he hopefully will continue to do so      Follow Up:  KARI Ludwig MD  Attending, Orthopaedic Surgery  Hand, Wrist, and Elbow Surgery  [address] [facility] [place] [facility]    ____________________________________________________________________________________________________________________________________________      CHIEF COMPLAINT:  Chief Complaint   Patient presents with   • Right Hand - Pain       SUBJECTIVE:  Lucia Maria is a 51y.o. year old RHD male who presents today at the request of Latoya Pineda PA-C for evaluation and treatment of right hand pain. Pt reportedly tripped on 10/06/23 and struck the open palm of his right hand on a railing. No obvious fx seen on xray. Pt diagnosed with 5th metacarpal contusion and placed into a hand-based ulnar gutter splint to use prn. Patient states he is 50-60% better. Occupation:            I have personally reviewed all the relevant PMH, PSH, SH, FH, Medications and allergies      PAST MEDICAL HISTORY:  History reviewed. No pertinent past medical history. PAST SURGICAL HISTORY:  History reviewed. No pertinent surgical history.     FAMILY HISTORY:  Family History   Problem Relation Age of Onset   • Anxiety disorder Mother    • Hypertension Father    • Diabetes Father    • Hypertension Sister    • Diabetes Sister        SOCIAL HISTORY:  Social History     Tobacco Use   • Smoking status: Former     Packs/day: 0.20     Types: Cigarettes     Quit date: 2021     Years since quittin.4   • Smokeless tobacco: Never   Vaping Use   • Vaping Use: Every day   • Substances: THC, CBD, Flavoring   Substance Use Topics   • Alcohol use: Not Currently   • Drug use: Yes     Types: Marijuana       MEDICATIONS:    Current Outpatient Medications:   •  Diclofenac Sodium (VOLTAREN) 1 %, Apply 2 g topically 4 (four) times a day, Disp: 100 g, Rfl: 3  •  Diclofenac Sodium (VOLTAREN) 1 %, Apply 2 g topically 4 (four) times a day, Disp: 100 g, Rfl: 2  •  erythromycin (ILOTYCIN) ophthalmic ointment, Administer 0.5 inches to both eyes 3 (three) times a day, Disp: 3.5 g, Rfl: 0  •  famotidine (PEPCID) 20 mg tablet, TAKE 1 TABLET BY MOUTH TWICE A DAY, Disp: 60 tablet, Rfl: 0  •  fluticasone (FLONASE) 50 mcg/act nasal spray, 1 spray into each nostril daily, Disp: 11.1 mL, Rfl: 3  •  loratadine (CLARITIN) 10 mg tablet, TAKE 1 TABLET BY MOUTH EVERY DAY, Disp: 90 tablet, Rfl: 1  •  ibuprofen (MOTRIN) 600 mg tablet, Take 1 tablet (600 mg total) by mouth every 6 (six) hours as needed for mild pain for up to 5 days, Disp: 20 tablet, Rfl: 0  •  Poison Ivy Treatments (ZANFEL) MISC, Apply 1 application topically 3 (three) times a day as needed (for pruritis) (Patient not taking: Reported on 2022), Disp: 1 Tube, Rfl: 0    ALLERGIES:  No Known Allergies        REVIEW OF SYSTEMS:  Musculoskeletal:        As noted in HPI. All other systems reviewed and are negative. VITALS:  There were no vitals filed for this visit.     LABS:  HgA1c: No results found for: "HGBA1C"  BMP:   Lab Results   Component Value Date    CALCIUM 8.5 2022    K 3.9 2022    CO2 22 2022     (H) 2022    BUN 18 2022    CREATININE 0.81 2022       _____________________________________________________  PHYSICAL EXAMINATION:  General: well developed and well nourished, alert, oriented times 3, and appears comfortable  Psychiatric: Normal  HEENT: Normocephalic, Atraumatic Trachea Midline, No torticollis  Pulmonary: No audible wheezing or respiratory distress   Abdomen/GI: Non tender, non distended   Cardiovascular: No pitting edema, 2+ radial pulse   Skin: No masses, erythema, lacerations, fluctation, ulcerations  Neurovascular: Sensation Intact to the Median, Ulnar, Radial Nerve, Motor Intact to the Median, Ulnar, Radial Nerve, and Pulses Intact  Musculoskeletal: Normal, except as noted in detailed exam and in HPI. MUSCULOSKELETAL EXAMINATION:  Right Hand:  Skin intact. Pt able to make a composite fist.  Mild ttp volar MCP head. Pt able to extend the finger.   Brisk capillary refill.     ___________________________________________________  STUDIES REVIEWED:  Xrays of the right hand were reviewed and independently interpreted in PACS by Dr. Jim Walker and demonstrate no acute osseous abnormalities or signs of old fx          PROCEDURES PERFORMED:  Procedures  No Procedures performed today    _____________________________________________________      Taryn Doyle    I,:  Kathleen Fox PA-C am acting as a scribe while in the presence of the attending physician.:       I,:  Jc Zapata MD personally performed the services described in this documentation    as scribed in my presence.:

## 2024-01-12 ENCOUNTER — OFFICE VISIT (OUTPATIENT)
Dept: FAMILY MEDICINE CLINIC | Facility: CLINIC | Age: 49
End: 2024-01-12

## 2024-01-12 VITALS
HEIGHT: 67 IN | HEART RATE: 61 BPM | SYSTOLIC BLOOD PRESSURE: 125 MMHG | TEMPERATURE: 98.2 F | OXYGEN SATURATION: 99 % | WEIGHT: 220.4 LBS | BODY MASS INDEX: 34.59 KG/M2 | RESPIRATION RATE: 18 BRPM | DIASTOLIC BLOOD PRESSURE: 87 MMHG

## 2024-01-12 DIAGNOSIS — K21.9 GASTROESOPHAGEAL REFLUX DISEASE WITHOUT ESOPHAGITIS: ICD-10-CM

## 2024-01-12 DIAGNOSIS — H92.01 RIGHT EAR PAIN: Primary | ICD-10-CM

## 2024-01-12 PROCEDURE — 99213 OFFICE O/P EST LOW 20 MIN: CPT | Performed by: FAMILY MEDICINE

## 2024-01-12 RX ORDER — FAMOTIDINE 20 MG/1
20 TABLET, FILM COATED ORAL 2 TIMES DAILY
Qty: 60 TABLET | Refills: 0 | Status: SHIPPED | OUTPATIENT
Start: 2024-01-12

## 2024-01-12 NOTE — ASSESSMENT & PLAN NOTE
Presenting for evaluation of right ear pain x4 days. Behind right pinna, extends to ear canal, no discharge or fever. Tympanic membrane grey and pearly, does not appear bulging, canal non erythematous, earlobe not tender to touch. Patient frequently wears tight fitting headphones all day and has a home commercial ear wax  which he has used recently in same ear. Low suspicion for ongoing infection, likely local irritation from commercial device. Recommend cessation and rest, recommended 1 week follow up however patient states it is difficult for him to come to office. He can call back if pain has not improved, at that time can consider Abx.

## 2024-01-12 NOTE — PROGRESS NOTES
Name: David Ferrari      : 1975      MRN: [id number]  Encounter Provider: Bashir Ramirez MD  Encounter Date: 2024   Encounter department: [facility]    Assessment & Plan     1. Right ear pain  Assessment & Plan:  Presenting for evaluation of right ear pain x4 days. Behind right pinna, extends to ear canal, no discharge or fever. Tympanic membrane grey and pearly, does not appear bulging, canal non erythematous, earlobe not tender to touch. Patient frequently wears tight fitting headphones all day and has a home commercial ear wax  which he has used recently in same ear. Low suspicion for ongoing infection, likely local irritation from commercial device. Recommend cessation and rest, recommended 1 week follow up however patient states it is difficult for him to come to office. He can call back if pain has not improved, at that time can consider Abx.      2. Gastroesophageal reflux disease without esophagitis  -     famotidine (PEPCID) 20 mg tablet; Take 1 tablet (20 mg total) by mouth 2 (two) times a day           Subjective      Presents for evaluation of ear pain.     Earache   There is pain in the right ear. This is a new problem. The current episode started in the past 7 days. The problem occurs hourly. The problem has been unchanged. There has been no fever. The pain is at a severity of 4/10. The pain is mild. Pertinent negatives include no abdominal pain, coughing, diarrhea, ear discharge, headaches, hearing loss, rash, rhinorrhea, sore throat or vomiting. He has tried nothing for the symptoms. There is no history of a chronic ear infection, hearing loss or a tympanostomy tube.     Review of Systems   Constitutional:  Negative for diaphoresis, fatigue and fever.   HENT:  Positive for ear pain. Negative for ear discharge, hearing loss, rhinorrhea and sore throat.    Eyes:  Negative for visual disturbance.   Respiratory:  Negative for cough.   "  Gastrointestinal:  Negative for abdominal pain, constipation, diarrhea and vomiting.   Genitourinary:  Negative for difficulty urinating.   Musculoskeletal:  Negative for back pain.   Skin:  Negative for rash and wound.   Neurological:  Negative for dizziness, weakness and headaches.       Current Outpatient Medications on File Prior to Visit   Medication Sig    Diclofenac Sodium (VOLTAREN) 1 % Apply 2 g topically 4 (four) times a day    fluticasone (FLONASE) 50 mcg/act nasal spray 1 spray into each nostril daily    loratadine (CLARITIN) 10 mg tablet TAKE 1 TABLET BY MOUTH EVERY DAY    [DISCONTINUED] famotidine (PEPCID) 20 mg tablet TAKE 1 TABLET BY MOUTH TWICE A DAY    Diclofenac Sodium (VOLTAREN) 1 % Apply 2 g topically 4 (four) times a day (Patient not taking: Reported on 1/12/2024)    erythromycin (ILOTYCIN) ophthalmic ointment Administer 0.5 inches to both eyes 3 (three) times a day (Patient not taking: Reported on 1/12/2024)    ibuprofen (MOTRIN) 600 mg tablet Take 1 tablet (600 mg total) by mouth every 6 (six) hours as needed for mild pain for up to 5 days    [DISCONTINUED] Poison Ivy Treatments (ZANFEL) MISC Apply 1 application topically 3 (three) times a day as needed (for pruritis) (Patient not taking: Reported on 7/29/2022)       Objective     /87 (BP Location: Right arm, Patient Position: Sitting, Cuff Size: Large)   Pulse 61   Temp 98.2 °F (36.8 °C) (Temporal)   Resp 18   Ht 5' 7\" (1.702 m)   Wt 100 kg (220 lb 6.4 oz)   SpO2 99%   BMI 34.52 kg/m²     Physical Exam  Vitals reviewed.   Constitutional:       General: He is not in acute distress.     Appearance: He is normal weight.   HENT:      Head: Normocephalic and atraumatic.      Right Ear: Hearing, tympanic membrane, ear canal and external ear normal. No laceration, drainage, swelling or tenderness. There is no impacted cerumen. No foreign body. No mastoid tenderness. No hemotympanum. Tympanic membrane is not bulging.      Left Ear: " Hearing, tympanic membrane, ear canal and external ear normal.   Eyes:      Conjunctiva/sclera: Conjunctivae normal.   Cardiovascular:      Rate and Rhythm: Normal rate and regular rhythm.      Heart sounds: Normal heart sounds. No murmur heard.  Pulmonary:      Effort: No respiratory distress.      Breath sounds: Normal breath sounds. No wheezing.   Abdominal:      General: Bowel sounds are normal.      Palpations: Abdomen is soft.      Tenderness: There is no abdominal tenderness.   Musculoskeletal:         General: Normal range of motion.      Cervical back: Normal range of motion.   Skin:     General: Skin is warm and dry.   Neurological:      General: No focal deficit present.      Mental Status: He is alert. Mental status is at baseline.   Psychiatric:         Mood and Affect: Mood normal.       Bashir Ramirez MD

## 2024-02-11 DIAGNOSIS — K21.9 GASTROESOPHAGEAL REFLUX DISEASE WITHOUT ESOPHAGITIS: ICD-10-CM

## 2024-02-12 RX ORDER — FAMOTIDINE 20 MG/1
20 TABLET, FILM COATED ORAL 2 TIMES DAILY
Qty: 60 TABLET | Refills: 0 | Status: SHIPPED | OUTPATIENT
Start: 2024-02-12

## 2024-03-10 DIAGNOSIS — K21.9 GASTROESOPHAGEAL REFLUX DISEASE WITHOUT ESOPHAGITIS: ICD-10-CM

## 2024-03-11 RX ORDER — FAMOTIDINE 20 MG/1
20 TABLET, FILM COATED ORAL 2 TIMES DAILY
Qty: 60 TABLET | Refills: 0 | Status: SHIPPED | OUTPATIENT
Start: 2024-03-11

## 2024-04-07 DIAGNOSIS — K21.9 GASTROESOPHAGEAL REFLUX DISEASE WITHOUT ESOPHAGITIS: ICD-10-CM

## 2024-04-08 RX ORDER — FAMOTIDINE 20 MG/1
20 TABLET, FILM COATED ORAL 2 TIMES DAILY
Qty: 60 TABLET | Refills: 0 | Status: SHIPPED | OUTPATIENT
Start: 2024-04-08

## 2024-05-08 DIAGNOSIS — K21.9 GASTROESOPHAGEAL REFLUX DISEASE WITHOUT ESOPHAGITIS: ICD-10-CM

## 2024-05-09 RX ORDER — FAMOTIDINE 20 MG/1
20 TABLET, FILM COATED ORAL 2 TIMES DAILY
Qty: 60 TABLET | Refills: 0 | Status: SHIPPED | OUTPATIENT
Start: 2024-05-09

## 2024-06-11 DIAGNOSIS — K21.9 GASTROESOPHAGEAL REFLUX DISEASE WITHOUT ESOPHAGITIS: ICD-10-CM

## 2024-06-11 RX ORDER — FAMOTIDINE 20 MG/1
20 TABLET, FILM COATED ORAL 2 TIMES DAILY
Qty: 60 TABLET | Refills: 0 | Status: SHIPPED | OUTPATIENT
Start: 2024-06-11

## 2024-07-17 DIAGNOSIS — K21.9 GASTROESOPHAGEAL REFLUX DISEASE WITHOUT ESOPHAGITIS: ICD-10-CM

## 2024-07-17 RX ORDER — FAMOTIDINE 20 MG/1
20 TABLET, FILM COATED ORAL 2 TIMES DAILY
Qty: 90 TABLET | Refills: 1 | Status: SHIPPED | OUTPATIENT
Start: 2024-07-17

## 2024-07-30 DIAGNOSIS — K21.9 GASTROESOPHAGEAL REFLUX DISEASE WITHOUT ESOPHAGITIS: ICD-10-CM

## 2024-07-31 RX ORDER — FAMOTIDINE 20 MG/1
20 TABLET, FILM COATED ORAL 2 TIMES DAILY
Qty: 180 TABLET | Refills: 0 | Status: SHIPPED | OUTPATIENT
Start: 2024-07-31